# Patient Record
Sex: FEMALE | Race: WHITE | NOT HISPANIC OR LATINO | Employment: FULL TIME | URBAN - METROPOLITAN AREA
[De-identification: names, ages, dates, MRNs, and addresses within clinical notes are randomized per-mention and may not be internally consistent; named-entity substitution may affect disease eponyms.]

---

## 2023-11-11 PROBLEM — F41.9 ANXIETY: Status: ACTIVE | Noted: 2023-11-11

## 2023-11-11 PROBLEM — J30.9 ALLERGIC RHINITIS: Status: ACTIVE | Noted: 2023-11-11

## 2023-11-11 PROBLEM — R03.0 ELEVATED BLOOD PRESSURE, SITUATIONAL: Status: ACTIVE | Noted: 2023-11-11

## 2023-11-11 PROBLEM — E66.01 OBESITY, MORBID, BMI 40.0-49.9 (MULTI): Status: ACTIVE | Noted: 2023-11-11

## 2023-11-11 PROBLEM — K21.9 CHRONIC GERD: Status: ACTIVE | Noted: 2023-11-11

## 2023-11-11 PROBLEM — R31.9 HEMATURIA: Status: ACTIVE | Noted: 2023-11-11

## 2023-11-11 RX ORDER — FLUTICASONE PROPIONATE 50 MCG
1 SPRAY, SUSPENSION (ML) NASAL
COMMUNITY
Start: 2018-11-26 | End: 2024-01-05 | Stop reason: SDUPTHER

## 2023-11-11 RX ORDER — PANTOPRAZOLE SODIUM 40 MG/1
1 TABLET, DELAYED RELEASE ORAL DAILY
COMMUNITY
Start: 2018-11-26 | End: 2023-11-13 | Stop reason: SDUPTHER

## 2023-11-11 RX ORDER — ALPRAZOLAM 0.25 MG/1
1 TABLET ORAL DAILY PRN
COMMUNITY
Start: 2018-11-26 | End: 2024-01-05 | Stop reason: SDUPTHER

## 2023-11-13 ENCOUNTER — OFFICE VISIT (OUTPATIENT)
Dept: PRIMARY CARE | Facility: CLINIC | Age: 43
End: 2023-11-13
Payer: COMMERCIAL

## 2023-11-13 VITALS
HEART RATE: 89 BPM | SYSTOLIC BLOOD PRESSURE: 100 MMHG | OXYGEN SATURATION: 97 % | BODY MASS INDEX: 49.69 KG/M2 | WEIGHT: 293 LBS | DIASTOLIC BLOOD PRESSURE: 63 MMHG | TEMPERATURE: 97.4 F

## 2023-11-13 DIAGNOSIS — Z12.31 ENCOUNTER FOR SCREENING MAMMOGRAM FOR BREAST CANCER: ICD-10-CM

## 2023-11-13 DIAGNOSIS — R06.81 WITNESSED EPISODE OF APNEA: ICD-10-CM

## 2023-11-13 DIAGNOSIS — K21.9 CHRONIC GERD: ICD-10-CM

## 2023-11-13 DIAGNOSIS — F41.9 ANXIETY: ICD-10-CM

## 2023-11-13 DIAGNOSIS — Z00.00 ANNUAL PHYSICAL EXAM: Primary | ICD-10-CM

## 2023-11-13 DIAGNOSIS — E66.01 OBESITY, MORBID, BMI 40.0-49.9 (MULTI): ICD-10-CM

## 2023-11-13 PROBLEM — R31.9 HEMATURIA: Status: RESOLVED | Noted: 2023-11-11 | Resolved: 2023-11-13

## 2023-11-13 PROBLEM — R03.0 ELEVATED BLOOD PRESSURE, SITUATIONAL: Status: RESOLVED | Noted: 2023-11-11 | Resolved: 2023-11-13

## 2023-11-13 PROCEDURE — 99396 PREV VISIT EST AGE 40-64: CPT | Performed by: INTERNAL MEDICINE

## 2023-11-13 RX ORDER — PANTOPRAZOLE SODIUM 40 MG/1
40 TABLET, DELAYED RELEASE ORAL DAILY
Qty: 90 TABLET | Refills: 3 | Status: SHIPPED | OUTPATIENT
Start: 2023-11-13 | End: 2024-01-05 | Stop reason: SDUPTHER

## 2023-11-13 ASSESSMENT — PATIENT HEALTH QUESTIONNAIRE - PHQ9
1. LITTLE INTEREST OR PLEASURE IN DOING THINGS: NOT AT ALL
2. FEELING DOWN, DEPRESSED OR HOPELESS: NOT AT ALL
SUM OF ALL RESPONSES TO PHQ9 QUESTIONS 1 AND 2: 0

## 2023-11-13 NOTE — PROGRESS NOTES
Subjective   Patient ID: Karmen Sheets is a 43 y.o. female who presents for Annual Exam.  HPI    Patient is here for annual exam  The last health maintenance visit was 1 year(s) ago.    The patient's health since the last visit is described as good. There are no interval changes in the patient's PMH, PSH, and current medications. There are no interval changes in the patient's social and family history. She has regular dental visits. She denies vision problems. Vision care includes wearing soft contact lenses and an eye examination more than a year ago. She denies hearing loss. Immunizations status: not up to date . declines flu and covid shot and tdap.   Lifestyle: She consumes a diverse and healthy diet. She has weight concerns. She does not exercise regularly. She uses tobacco. She denies alcohol use. 5 cigarettes daily ,started at age 15.smoked one or two initially.   Reproductive health: No periods after ablation.she has had a tubal occlusion. she is sexually active. She is monogamous with a male partner.  Pregnancy History:  2 and 2 full term.   Cervical cancer screening: cancer screening reviewed and current . patient has no history of an abnormal pap smear. 2019.               Breast cancer screening: cancer screening reviewed and updated . mammogram ordered.   Metabolic screening: lipid profile performed  and glucose screening performed .   Takes xanx at end of year time period due to work stress  need refills.no drug use     has gerd for over 15 yrs and need medicine daily  never had egd.  Does not have time to do it.  No dysphagia or weight loss or appetite loss.  Change in bowels     allergies managed with flonase  Has snoring and daytime fatigue and sleepiness.  Family has noticed witnessed apnea and she wakes up coughing at night several times    Review of Systems  General: No significant change in weight, no fatigue, no fever, chills, or night sweats.  HEENT: No headache, dizziness, syncope.  No blurred vision, double vision. No hearing loss, or ringing. No nasal discharge, sinus problems. No loose teeth, sore throat, hoarseness or neck stiffness.   Breast: No pain or discharge. No mass felt.   Respiratory: No cough, mucous in throat, hemoptysis, wheezing, or shortness of breath. No snoring.  Cardiac: No chest pain, palpitations, orthopnea. No leg swelling or claudication pain.   Gastrointestinal: No indigestion, heartburn, nausea, vomiting, diarrhea, constipation, rectal bleeding or hemorrhoids.  Genitourinary: No UTI symptoms, stones, incontinence.  OBGYN: No abnormal bleeding, No pelvic pain or bloating.  Endocrine: No excess thirst or urination.  Hematopoietic: No anemia, easy bruising or bleeding. No history of blood transfusion.  Neuro: No localized weakness, numbness or tingling. No tremor, history of seizure. No history of memory problems.  Psychological: No anxiety, depression .  Has stress.     HISTORY  Social History     Socioeconomic History    Marital status:      Spouse name: Not on file    Number of children: Not on file    Years of education: Not on file    Highest education level: Not on file   Occupational History    Not on file   Tobacco Use    Smoking status: Every Day     Types: Cigarettes    Smokeless tobacco: Never   Substance and Sexual Activity    Alcohol use: Yes    Drug use: Never    Sexual activity: Not on file   Other Topics Concern    Not on file   Social History Narrative    Not on file     Social Determinants of Health     Financial Resource Strain: Not on file   Food Insecurity: Not on file   Transportation Needs: Not on file   Physical Activity: Not on file   Stress: Not on file   Social Connections: Not on file   Intimate Partner Violence: Not on file   Housing Stability: Not on file     Family History   Problem Relation Name Age of Onset    Coronary artery disease Mother      COPD Mother      Depression Mother      Other (Malignant Neoplasm of Tonsil) Father       Heart attack Father      Hashimoto's thyroiditis Sister      Leukemia Paternal Grandfather       Past Medical History:   Diagnosis Date    Other conditions influencing health status     HPV test positive    Personal history of diseases of the blood and blood-forming organs and certain disorders involving the immune mechanism 01/21/2019    History of anemia    Personal history of other diseases of the female genital tract 01/21/2019    History of menorrhagia    Personal history of other diseases of the female genital tract     History of abnormal uterine bleeding     History reviewed. No pertinent surgical history.    Objective   /63   Pulse 89   Temp 36.3 °C (97.4 °F)   Wt (!) 150 kg (331 lb 9.6 oz)   SpO2 97%   BMI 49.69 kg/m²      Lab Results   Component Value Date    WBC 7.7 02/16/2019    HGB 12.8 02/16/2019    HCT 40.7 02/16/2019    MCV 89 02/16/2019     02/16/2019      Physical Exam    Assessment/Plan   Problem List Items Addressed This Visit       Anxiety    Relevant Orders    Drug Screen, Urine With Reflex to Confirmation    Chronic GERD    Relevant Medications    pantoprazole (ProtoNix) 40 mg EC tablet    Other Relevant Orders    Vitamin D 25-Hydroxy,Total (for eval of Vitamin D levels)    Obesity, morbid, BMI 40.0-49.9 (CMS/HCC)    Annual physical exam - Primary    Relevant Orders    CBC and Auto Differential    Comprehensive Metabolic Panel    Lipid Panel    Vitamin D 25-Hydroxy,Total (for eval of Vitamin D levels)    Witnessed episode of apnea    Relevant Orders    Home sleep apnea test (HSAT)     Other Visit Diagnoses       Encounter for screening mammogram for breast cancer        Relevant Orders    BI mammo bilateral screening tomosynthesis          Patient examined counseling completed.  Medical conditions are stable.  Discussed importance of screening tests including colon cancer screening and mammogram and Pap.  She states that there is no time left after work and taking care of  family  Mammogram reordered  Routine labs ordered  She has symptoms of sleep apnea including witnessed apnea.  Will order a home sleep study  Takes alprazolam during end of the year due to stress at work.  Controlled substance agreement signed and drug screen ordered.  Will give referral after reviewing drug screen

## 2023-11-18 ENCOUNTER — LAB (OUTPATIENT)
Dept: LAB | Facility: LAB | Age: 43
End: 2023-11-18
Payer: COMMERCIAL

## 2023-11-18 DIAGNOSIS — Z00.00 ANNUAL PHYSICAL EXAM: ICD-10-CM

## 2023-11-18 DIAGNOSIS — F41.9 ANXIETY: ICD-10-CM

## 2023-11-18 DIAGNOSIS — K21.9 CHRONIC GERD: ICD-10-CM

## 2023-11-18 LAB
AMPHETAMINES UR QL SCN: NORMAL
BARBITURATES UR QL SCN: NORMAL
BENZODIAZ UR QL SCN: NORMAL
BZE UR QL SCN: NORMAL
CANNABINOIDS UR QL SCN: NORMAL
FENTANYL+NORFENTANYL UR QL SCN: NORMAL
OPIATES UR QL SCN: NORMAL
OXYCODONE+OXYMORPHONE UR QL SCN: NORMAL
PCP UR QL SCN: NORMAL

## 2023-11-18 PROCEDURE — 82306 VITAMIN D 25 HYDROXY: CPT

## 2023-11-18 PROCEDURE — 80053 COMPREHEN METABOLIC PANEL: CPT

## 2023-11-18 PROCEDURE — 85025 COMPLETE CBC W/AUTO DIFF WBC: CPT

## 2023-11-18 PROCEDURE — 80307 DRUG TEST PRSMV CHEM ANLYZR: CPT

## 2023-11-18 PROCEDURE — 36415 COLL VENOUS BLD VENIPUNCTURE: CPT

## 2023-11-18 PROCEDURE — 80061 LIPID PANEL: CPT

## 2023-11-19 LAB
25(OH)D3 SERPL-MCNC: 22 NG/ML (ref 30–100)
ALBUMIN SERPL BCP-MCNC: 3.9 G/DL (ref 3.4–5)
ALP SERPL-CCNC: 86 U/L (ref 33–110)
ALT SERPL W P-5'-P-CCNC: 13 U/L (ref 7–45)
ANION GAP SERPL CALC-SCNC: 11 MMOL/L (ref 10–20)
AST SERPL W P-5'-P-CCNC: 13 U/L (ref 9–39)
BASOPHILS # BLD AUTO: 0.05 X10*3/UL (ref 0–0.1)
BASOPHILS NFR BLD AUTO: 0.6 %
BILIRUB SERPL-MCNC: 0.4 MG/DL (ref 0–1.2)
BUN SERPL-MCNC: 15 MG/DL (ref 6–23)
CALCIUM SERPL-MCNC: 8.9 MG/DL (ref 8.6–10.6)
CHLORIDE SERPL-SCNC: 105 MMOL/L (ref 98–107)
CHOLEST SERPL-MCNC: 155 MG/DL (ref 0–199)
CHOLESTEROL/HDL RATIO: 3.5
CO2 SERPL-SCNC: 29 MMOL/L (ref 21–32)
CREAT SERPL-MCNC: 0.71 MG/DL (ref 0.5–1.05)
EOSINOPHIL # BLD AUTO: 0.19 X10*3/UL (ref 0–0.7)
EOSINOPHIL NFR BLD AUTO: 2.4 %
ERYTHROCYTE [DISTWIDTH] IN BLOOD BY AUTOMATED COUNT: 12.7 % (ref 11.5–14.5)
GFR SERPL CREATININE-BSD FRML MDRD: >90 ML/MIN/1.73M*2
GLUCOSE SERPL-MCNC: 89 MG/DL (ref 74–99)
HCT VFR BLD AUTO: 43.5 % (ref 36–46)
HDLC SERPL-MCNC: 44.8 MG/DL
HGB BLD-MCNC: 14.3 G/DL (ref 12–16)
IMM GRANULOCYTES # BLD AUTO: 0.02 X10*3/UL (ref 0–0.7)
IMM GRANULOCYTES NFR BLD AUTO: 0.2 % (ref 0–0.9)
LDLC SERPL CALC-MCNC: 97 MG/DL
LYMPHOCYTES # BLD AUTO: 1.87 X10*3/UL (ref 1.2–4.8)
LYMPHOCYTES NFR BLD AUTO: 23.3 %
MCH RBC QN AUTO: 29.4 PG (ref 26–34)
MCHC RBC AUTO-ENTMCNC: 32.9 G/DL (ref 32–36)
MCV RBC AUTO: 90 FL (ref 80–100)
MONOCYTES # BLD AUTO: 0.48 X10*3/UL (ref 0.1–1)
MONOCYTES NFR BLD AUTO: 6 %
NEUTROPHILS # BLD AUTO: 5.43 X10*3/UL (ref 1.2–7.7)
NEUTROPHILS NFR BLD AUTO: 67.5 %
NON HDL CHOLESTEROL: 110 MG/DL (ref 0–149)
NRBC BLD-RTO: 0 /100 WBCS (ref 0–0)
PLATELET # BLD AUTO: 283 X10*3/UL (ref 150–450)
POTASSIUM SERPL-SCNC: 4.1 MMOL/L (ref 3.5–5.3)
PROT SERPL-MCNC: 6.5 G/DL (ref 6.4–8.2)
RBC # BLD AUTO: 4.86 X10*6/UL (ref 4–5.2)
SODIUM SERPL-SCNC: 141 MMOL/L (ref 136–145)
TRIGL SERPL-MCNC: 66 MG/DL (ref 0–149)
VLDL: 13 MG/DL (ref 0–40)
WBC # BLD AUTO: 8 X10*3/UL (ref 4.4–11.3)

## 2023-11-20 NOTE — RESULT ENCOUNTER NOTE
Reviewed your labs.  Vitamin D is low.  Take D3 1000 units daily and if you are already taking it double up on the supplements.  Other labs are fine

## 2024-01-05 DIAGNOSIS — J30.9 ALLERGIC RHINITIS, UNSPECIFIED SEASONALITY, UNSPECIFIED TRIGGER: ICD-10-CM

## 2024-01-05 DIAGNOSIS — K21.9 CHRONIC GERD: ICD-10-CM

## 2024-01-05 DIAGNOSIS — F41.9 ANXIETY: Primary | ICD-10-CM

## 2024-01-05 RX ORDER — FLUTICASONE PROPIONATE 50 MCG
1 SPRAY, SUSPENSION (ML) NASAL DAILY
Qty: 16 G | Refills: 3 | Status: SHIPPED | OUTPATIENT
Start: 2024-01-05

## 2024-01-05 RX ORDER — ALPRAZOLAM 0.25 MG/1
0.25 TABLET ORAL DAILY PRN
Qty: 90 TABLET | Refills: 0 | Status: SHIPPED | OUTPATIENT
Start: 2024-01-05 | End: 2024-04-04

## 2024-01-05 RX ORDER — PANTOPRAZOLE SODIUM 40 MG/1
40 TABLET, DELAYED RELEASE ORAL DAILY
Qty: 90 TABLET | Refills: 3 | Status: SHIPPED | OUTPATIENT
Start: 2024-01-05 | End: 2025-01-04

## 2024-03-01 ENCOUNTER — CLINICAL SUPPORT (OUTPATIENT)
Dept: SLEEP MEDICINE | Facility: CLINIC | Age: 44
End: 2024-03-01
Payer: COMMERCIAL

## 2024-03-01 DIAGNOSIS — R06.81 WITNESSED EPISODE OF APNEA: ICD-10-CM

## 2024-03-01 DIAGNOSIS — G47.33 OBSTRUCTIVE SLEEP APNEA (ADULT) (PEDIATRIC): ICD-10-CM

## 2024-03-01 PROCEDURE — 95806 SLEEP STUDY UNATT&RESP EFFT: CPT | Performed by: INTERNAL MEDICINE

## 2024-03-02 NOTE — PROGRESS NOTES
Type of Study: HOME SLEEP STUDY - NOMAD     The patient received equipment and instructions for use of the [employer] Nomad HSAT device. The patient was instructed how to apply the effort belts, cannula, thermistor. It was also explained how the Nomad and oximeter components work.  The patient was asked to record their sleep for an 8-hour period.     The patient was informed of their responsibility for the device and acknowledged this by signing the HSAT device contract. The patient was asked to return the device on 3-2-24 after 7PM to the [facility].     The patient was instructed to call 911 as usual for any medical- emergencies while at home.  The patient was also given a phone number for troubleshooting when using the device in case there were additional questions.

## 2024-03-04 ASSESSMENT — SLEEP AND FATIGUE QUESTIONNAIRES
HOW LIKELY ARE YOU TO NOD OFF OR FALL ASLEEP WHILE WATCHING TV: MODERATE CHANCE OF DOZING
ESS-CHAD TOTAL SCORE: 14
HOW LIKELY ARE YOU TO NOD OFF OR FALL ASLEEP WHEN YOU ARE A PASSENGER IN A CAR FOR AN HOUR WITHOUT A BREAK: MODERATE CHANCE OF DOZING
HOW LIKELY ARE YOU TO NOD OFF OR FALL ASLEEP WHILE SITTING QUIETLY AFTER LUNCH WITHOUT ALCOHOL: SLIGHT CHANCE OF DOZING
HOW LIKELY ARE YOU TO NOD OFF OR FALL ASLEEP WHILE SITTING AND READING: SLIGHT CHANCE OF DOZING
HOW LIKELY ARE YOU TO NOD OFF OR FALL ASLEEP IN A CAR, WHILE STOPPED FOR A FEW MINUTES IN TRAFFIC: SLIGHT CHANCE OF DOZING
HOW LIKELY ARE YOU TO NOD OFF OR FALL ASLEEP WHILE SITTING AND TALKING TO SOMEONE: SLIGHT CHANCE OF DOZING
SITING INACTIVE IN A PUBLIC PLACE LIKE A CLASS ROOM OR A MOVIE THEATER: HIGH CHANCE OF DOZING
HOW LIKELY ARE YOU TO NOD OFF OR FALL ASLEEP WHILE LYING DOWN TO REST IN THE AFTERNOON WHEN CIRCUMSTANCES PERMIT: HIGH CHANCE OF DOZING

## 2024-03-05 NOTE — RESULT ENCOUNTER NOTE
Got sleep study results which came back abnormal.  Please call me to discuss  It shows moderate sleep apnea and also low oxygenation during sleep  And we need to start treatment right away  Please make a follow-up with me or we will talk about referring you to sleep specialist

## 2024-03-07 DIAGNOSIS — G47.33 OSA (OBSTRUCTIVE SLEEP APNEA): Primary | ICD-10-CM

## 2024-03-14 ENCOUNTER — APPOINTMENT (OUTPATIENT)
Dept: SLEEP MEDICINE | Facility: CLINIC | Age: 44
End: 2024-03-14
Payer: COMMERCIAL

## 2024-06-20 ENCOUNTER — APPOINTMENT (OUTPATIENT)
Dept: SLEEP MEDICINE | Facility: CLINIC | Age: 44
End: 2024-06-20
Payer: COMMERCIAL

## 2024-07-02 ENCOUNTER — APPOINTMENT (OUTPATIENT)
Dept: SLEEP MEDICINE | Facility: CLINIC | Age: 44
End: 2024-07-02
Payer: COMMERCIAL

## 2024-11-13 ENCOUNTER — APPOINTMENT (OUTPATIENT)
Dept: PRIMARY CARE | Facility: CLINIC | Age: 44
End: 2024-11-13
Payer: COMMERCIAL

## 2024-11-13 VITALS
BODY MASS INDEX: 43.4 KG/M2 | DIASTOLIC BLOOD PRESSURE: 80 MMHG | HEIGHT: 69 IN | WEIGHT: 293 LBS | OXYGEN SATURATION: 97 % | SYSTOLIC BLOOD PRESSURE: 138 MMHG | HEART RATE: 115 BPM

## 2024-11-13 DIAGNOSIS — Z00.00 ANNUAL PHYSICAL EXAM: Primary | ICD-10-CM

## 2024-11-13 DIAGNOSIS — Z12.11 SCREENING FOR COLON CANCER: ICD-10-CM

## 2024-11-13 DIAGNOSIS — E66.01 OBESITY, MORBID, BMI 40.0-49.9 (MULTI): ICD-10-CM

## 2024-11-13 DIAGNOSIS — G47.33 OSA (OBSTRUCTIVE SLEEP APNEA): ICD-10-CM

## 2024-11-13 DIAGNOSIS — Z12.31 ENCOUNTER FOR SCREENING MAMMOGRAM FOR BREAST CANCER: ICD-10-CM

## 2024-11-13 DIAGNOSIS — K21.9 CHRONIC GERD: ICD-10-CM

## 2024-11-13 DIAGNOSIS — F41.9 ANXIETY: ICD-10-CM

## 2024-11-13 PROCEDURE — 99396 PREV VISIT EST AGE 40-64: CPT | Performed by: INTERNAL MEDICINE

## 2024-11-13 PROCEDURE — 3008F BODY MASS INDEX DOCD: CPT | Performed by: INTERNAL MEDICINE

## 2024-11-13 RX ORDER — ALPRAZOLAM 0.25 MG/1
0.25 TABLET ORAL DAILY PRN
Qty: 90 TABLET | Refills: 0 | Status: SHIPPED | OUTPATIENT
Start: 2024-11-13 | End: 2025-02-11

## 2024-11-13 RX ORDER — PANTOPRAZOLE SODIUM 40 MG/1
40 TABLET, DELAYED RELEASE ORAL DAILY
Qty: 90 TABLET | Refills: 3 | Status: SHIPPED | OUTPATIENT
Start: 2024-11-13 | End: 2025-11-13

## 2024-11-13 ASSESSMENT — PATIENT HEALTH QUESTIONNAIRE - PHQ9
SUM OF ALL RESPONSES TO PHQ9 QUESTIONS 1 AND 2: 0
2. FEELING DOWN, DEPRESSED OR HOPELESS: NOT AT ALL
1. LITTLE INTEREST OR PLEASURE IN DOING THINGS: NOT AT ALL

## 2024-11-13 NOTE — PROGRESS NOTES
Subjective   Patient ID: Karmen Sheets is a 44 y.o. female who presents for Annual Exam (No pap.).  HPI    Patient is here for annual exam  Does not have any new concerns today  sees dentist regularly.    Diet is healthy 40% of the time.    Does not do regular exercise now.  pregnancy [id number]  No bladder symptoms  Cervical cancer screen current normal ,due  No history of abnormal Pap  Mammogram due   Colonoscopy due  Medical conditions: anxiety controlled on meds  Gerd controlled, never had EGD  Was not able to see sleep specialist  Vaccines:declined    Review of Systems  General: No significant change in weight, no fatigue, no fever, chills,  HEENT: No headache, dizziness, syncope. No blurred vision, double vision. No hearing loss, or ringing. No nasal discharge, sinus problems. No loose teeth, sore throat, hoarseness or neck stiffness.   Breast: No pain or discharge. No mass felt.   Respiratory: No cough, mucous in throat, hemoptysis, wheezing, or shortness of breath.has snoring and apnea  Cardiac: No chest pain, palpitations, orthopnea. No leg swelling or claudication pain.   Gastrointestinal: No indigestion,has  heartburn, no nausea, vomiting, diarrhea, constipation, rectal bleeding or hemorrhoids.  Genitourinary: No UTI symptoms, stones, incontinence.  OBGYN: No abnormal bleeding, No pelvic pain or bloating.  Endocrine: No excess thirst or urination.  Hematopoietic: No anemia, easy bruising or bleeding. No history of blood transfusion.  Neuro: No localized weakness, numbness or tingling. No tremor, history of seizure. No history of memory problems.  Psychological: No depression    HISTORY  Social History     Socioeconomic History    Marital status:      Spouse name: Not on file    Number of children: Not on file    Years of education: Not on file    Highest education level: Not on file   Occupational History    Not on file   Tobacco Use    Smoking status: Every Day     Types: Cigarettes     "Smokeless tobacco: Never   Substance and Sexual Activity    Alcohol use: Yes    Drug use: Never    Sexual activity: Not on file   Other Topics Concern    Not on file   Social History Narrative    Not on file     Social Drivers of Health     Financial Resource Strain: Not on file   Food Insecurity: Not on file   Transportation Needs: Not on file   Physical Activity: Not on file   Stress: Not on file   Social Connections: Not on file   Intimate Partner Violence: Not on file   Housing Stability: Not on file     Family History   Problem Relation Name Age of Onset    Coronary artery disease Mother      COPD Mother      Depression Mother      Other (Malignant Neoplasm of Tonsil) Father      Heart attack Father      Hashimoto's thyroiditis Sister      Leukemia Paternal Grandfather       Past Medical History:   Diagnosis Date    Other conditions influencing health status     HPV test positive    Personal history of diseases of the blood and blood-forming organs and certain disorders involving the immune mechanism 01/21/2019    History of anemia    Personal history of other diseases of the female genital tract 01/21/2019    History of menorrhagia    Personal history of other diseases of the female genital tract     History of abnormal uterine bleeding     History reviewed. No pertinent surgical history.  @VACCINES  Objective   /80   Pulse (!) 115   Ht 1.74 m (5' 8.5\")   Wt (!) 153 kg (337 lb)   SpO2 97%   BMI 50.50 kg/m²      Lab Results   Component Value Date    WBC 8.0 11/18/2023    HGB 14.3 11/18/2023    HCT 43.5 11/18/2023    MCV 90 11/18/2023     11/18/2023   PAP@MAMMOGRAM  Physical Exam  Constitutional: Alert and in no acute distress. Well developed, well nourished.   Eyes: Normal external exam. Pupils were equal in size, round, reactive to light (PERRL) with normal accommodation and extraocular movements intact (EOMI).   Ears, Nose, Mouth, and Throat: Otoscopic examination: Normal.  Hearing: Normal.  "   Neck: No neck mass was observed. Supple. Thyroid not enlarged and there were no palpable thyroid nodules.   Cardiovascular: Heart rate and rhythm were normal, normal S1 and S2, no gallops, no murmurs and no pericardial rub. Pedal pulses: Normal. No peripheral edema.   Pulmonary: No respiratory distress. Clear bilateral breath sounds.   Abdomen: Soft nontender; no abdominal mass palpated. No organomegaly. declined.   Genitourinary: Sees GYN  Musculoskeletal: Gait and station: Normal. Has thickening of the palmar tendon with probable ganglion cyst on right side. Range of motion: Normal.  Muscle strength/tone: Normal.    Skin: Striae abdomen  Psychiatric: Judgment and insight: Intact. Mood and affect: Normal.   Lymphatic: No cervical lymphadenopathy.       Assessment/Plan   Problem List Items Addressed This Visit       Anxiety    Relevant Medications    ALPRAZolam (Xanax) 0.25 mg tablet    Chronic GERD    Relevant Medications    pantoprazole (ProtoNix) 40 mg EC tablet    Other Relevant Orders    Referral to Gastroenterology    Obesity, morbid, BMI 40.0-49.9 (Multi)    Relevant Orders    Cortisol AM    Annual physical exam - Primary    Relevant Orders    Comprehensive Metabolic Panel    CBC and Auto Differential    Lipid Panel    ADRIEL (obstructive sleep apnea)    Relevant Orders    In-Center Sleep Study (Non-Sleep Provider Only)     Other Visit Diagnoses       Encounter for screening mammogram for breast cancer        Relevant Orders    BI mammo bilateral screening tomosynthesis    Screening for colon cancer        Relevant Orders    Referral to Gastroenterology          Preventive exam and counseling completed  She declines all vaccines  Due for colonoscopy and mammogram and Pap  She will make appointment with gynecologist  Mammogram ordered  She also needs endoscopy and would like to do it at the same time  Referral to GI  Has significant sleep apnea with hypoxemia  Will order CPAP titration  Check routine labs.  Add  cortisol due to obesity and striate formation and stress  Discussed treatment options including medications for weight loss  Start with some diet changes and treatment of medical problem like sleep apnea  Consider medication in the future  Anxiety controlled on treatment.  Uses medication during tax season.  Has signed controlled substance agreement  Follow-up in 1 year and as needed  Will call with sleep study results    Addendum  Completed peer to peer discussion regarding CPAP titration study.  Insurance denied it since home sleep study is adequate  They will authorize CPAP machine and supplies.

## 2024-11-16 ENCOUNTER — LAB (OUTPATIENT)
Dept: LAB | Facility: LAB | Age: 44
End: 2024-11-16
Payer: COMMERCIAL

## 2024-11-16 DIAGNOSIS — Z00.00 ANNUAL PHYSICAL EXAM: ICD-10-CM

## 2024-11-16 DIAGNOSIS — E66.01 OBESITY, MORBID, BMI 40.0-49.9 (MULTI): ICD-10-CM

## 2024-11-16 LAB
ALBUMIN SERPL BCP-MCNC: 4.1 G/DL (ref 3.4–5)
ALP SERPL-CCNC: 85 U/L (ref 33–110)
ALT SERPL W P-5'-P-CCNC: 17 U/L (ref 7–45)
ANION GAP SERPL CALC-SCNC: 13 MMOL/L (ref 10–20)
AST SERPL W P-5'-P-CCNC: 16 U/L (ref 9–39)
BASOPHILS # BLD AUTO: 0.05 X10*3/UL (ref 0–0.1)
BASOPHILS NFR BLD AUTO: 0.5 %
BILIRUB SERPL-MCNC: 0.5 MG/DL (ref 0–1.2)
BUN SERPL-MCNC: 12 MG/DL (ref 6–23)
CALCIUM SERPL-MCNC: 9.1 MG/DL (ref 8.6–10.6)
CHLORIDE SERPL-SCNC: 103 MMOL/L (ref 98–107)
CHOLEST SERPL-MCNC: 151 MG/DL (ref 0–199)
CHOLESTEROL/HDL RATIO: 3.5
CO2 SERPL-SCNC: 28 MMOL/L (ref 21–32)
CORTIS AM PEAK SERPL-MSCNC: 17.3 UG/DL (ref 5–20)
CREAT SERPL-MCNC: 0.72 MG/DL (ref 0.5–1.05)
EGFRCR SERPLBLD CKD-EPI 2021: >90 ML/MIN/1.73M*2
EOSINOPHIL # BLD AUTO: 0.22 X10*3/UL (ref 0–0.7)
EOSINOPHIL NFR BLD AUTO: 2.3 %
ERYTHROCYTE [DISTWIDTH] IN BLOOD BY AUTOMATED COUNT: 12.6 % (ref 11.5–14.5)
GLUCOSE SERPL-MCNC: 115 MG/DL (ref 74–99)
HCT VFR BLD AUTO: 44.2 % (ref 36–46)
HDLC SERPL-MCNC: 43 MG/DL
HGB BLD-MCNC: 14.4 G/DL (ref 12–16)
IMM GRANULOCYTES # BLD AUTO: 0.03 X10*3/UL (ref 0–0.7)
IMM GRANULOCYTES NFR BLD AUTO: 0.3 % (ref 0–0.9)
LDLC SERPL CALC-MCNC: 90 MG/DL
LYMPHOCYTES # BLD AUTO: 1.89 X10*3/UL (ref 1.2–4.8)
LYMPHOCYTES NFR BLD AUTO: 19.7 %
MCH RBC QN AUTO: 28.7 PG (ref 26–34)
MCHC RBC AUTO-ENTMCNC: 32.6 G/DL (ref 32–36)
MCV RBC AUTO: 88 FL (ref 80–100)
MONOCYTES # BLD AUTO: 0.46 X10*3/UL (ref 0.1–1)
MONOCYTES NFR BLD AUTO: 4.8 %
NEUTROPHILS # BLD AUTO: 6.95 X10*3/UL (ref 1.2–7.7)
NEUTROPHILS NFR BLD AUTO: 72.4 %
NON HDL CHOLESTEROL: 108 MG/DL (ref 0–149)
NRBC BLD-RTO: 0 /100 WBCS (ref 0–0)
PLATELET # BLD AUTO: 284 X10*3/UL (ref 150–450)
POTASSIUM SERPL-SCNC: 4.2 MMOL/L (ref 3.5–5.3)
PROT SERPL-MCNC: 7.1 G/DL (ref 6.4–8.2)
RBC # BLD AUTO: 5.01 X10*6/UL (ref 4–5.2)
SODIUM SERPL-SCNC: 140 MMOL/L (ref 136–145)
TRIGL SERPL-MCNC: 89 MG/DL (ref 0–149)
VLDL: 18 MG/DL (ref 0–40)
WBC # BLD AUTO: 9.6 X10*3/UL (ref 4.4–11.3)

## 2024-11-16 PROCEDURE — 80061 LIPID PANEL: CPT

## 2024-11-16 PROCEDURE — 36415 COLL VENOUS BLD VENIPUNCTURE: CPT

## 2024-11-16 PROCEDURE — 80053 COMPREHEN METABOLIC PANEL: CPT

## 2024-11-16 PROCEDURE — 85025 COMPLETE CBC W/AUTO DIFF WBC: CPT

## 2024-11-16 PROCEDURE — 82533 TOTAL CORTISOL: CPT

## 2024-11-21 ENCOUNTER — HOSPITAL ENCOUNTER (OUTPATIENT)
Dept: RADIOLOGY | Facility: CLINIC | Age: 44
Discharge: HOME | End: 2024-11-21
Payer: COMMERCIAL

## 2024-11-21 VITALS — WEIGHT: 293 LBS | BODY MASS INDEX: 44.41 KG/M2 | HEIGHT: 68 IN

## 2024-11-21 DIAGNOSIS — Z12.31 ENCOUNTER FOR SCREENING MAMMOGRAM FOR BREAST CANCER: ICD-10-CM

## 2024-11-21 PROCEDURE — 77067 SCR MAMMO BI INCL CAD: CPT

## 2024-12-12 DIAGNOSIS — G47.33 OSA (OBSTRUCTIVE SLEEP APNEA): Primary | ICD-10-CM

## 2024-12-16 ENCOUNTER — APPOINTMENT (OUTPATIENT)
Dept: SLEEP MEDICINE | Facility: CLINIC | Age: 44
End: 2024-12-16
Payer: COMMERCIAL

## 2025-03-11 ENCOUNTER — APPOINTMENT (OUTPATIENT)
Dept: PRIMARY CARE | Facility: CLINIC | Age: 45
End: 2025-03-11
Payer: COMMERCIAL

## 2025-03-11 VITALS
SYSTOLIC BLOOD PRESSURE: 140 MMHG | OXYGEN SATURATION: 97 % | DIASTOLIC BLOOD PRESSURE: 84 MMHG | WEIGHT: 293 LBS | HEIGHT: 68 IN | BODY MASS INDEX: 44.41 KG/M2 | HEART RATE: 88 BPM

## 2025-03-11 DIAGNOSIS — E66.01 OBESITY, MORBID, BMI 40.0-49.9 (MULTI): ICD-10-CM

## 2025-03-11 DIAGNOSIS — G47.33 OSA (OBSTRUCTIVE SLEEP APNEA): ICD-10-CM

## 2025-03-11 DIAGNOSIS — R73.9 HYPERGLYCEMIA: Primary | ICD-10-CM

## 2025-03-11 DIAGNOSIS — M25.50 ARTHRALGIA, UNSPECIFIED JOINT: ICD-10-CM

## 2025-03-11 PROCEDURE — 99214 OFFICE O/P EST MOD 30 MIN: CPT | Performed by: INTERNAL MEDICINE

## 2025-03-11 PROCEDURE — 3008F BODY MASS INDEX DOCD: CPT | Performed by: INTERNAL MEDICINE

## 2025-03-11 ASSESSMENT — PATIENT HEALTH QUESTIONNAIRE - PHQ9
SUM OF ALL RESPONSES TO PHQ9 QUESTIONS 1 & 2: 0
1. LITTLE INTEREST OR PLEASURE IN DOING THINGS: NOT AT ALL
2. FEELING DOWN, DEPRESSED OR HOPELESS: NOT AT ALL

## 2025-03-11 NOTE — PROGRESS NOTES
"Subjective   Patient ID: Karmen Sheets is a 44 y.o. female who presents for Follow-up (Sleep Apnea/Aches and Pains everywhere) and Weight Management.    HPI   Has been using cpap.cannot tolerate it that much  Hurts her nose  Using it at least 4 hours  Does not feel any change with it  Has aches and pains every where  Arm goes cold and numb  Has joint pains in knees and shoulder and other joints  Interested in weight loss medication  Work is very stressful    Review of Systems  No fever or chills  Has weight gain  No polyuria polydipsia  No depression  Objective   /84   Pulse 88   Ht 1.727 m (5' 8\")   Wt (!) 158 kg (348 lb 6.4 oz)   SpO2 97%   BMI 52.97 kg/m²     Physical Exam  Not in acute distress  No pallor or icterus  Neck supple  Chest is clear  CVS: S1-S2 regular rate and rhythm  Examination of the knee and shoulder joints shows no effusion.  Range of motion okay  No finger synovitis  Assessment/Plan   Problem List Items Addressed This Visit             ICD-10-CM    Obesity, morbid, BMI 40.0-49.9 (Multi) E66.01    Relevant Orders    TSH with reflex to Free T4 if abnormal    Vitamin B12    ADRIEL (obstructive sleep apnea) G47.33    Relevant Orders    Referral to Adult Sleep Medicine    Hyperglycemia - Primary R73.9    Relevant Orders    CBC and Auto Differential    Hemoglobin A1C    Vitamin B12    Arthralgia M25.50    Relevant Orders    CBC and Auto Differential    Comprehensive Metabolic Panel    Vitamin D 25-Hydroxy,Total (for eval of Vitamin D levels)    Vitamin B12    Uric acid     Patient has difficulty using her CPAP.  Does not see any improvement in symptoms.  Using it for 4 hours.  Will refer to sleep specialist for further recommendation  Will work on weight loss  Discussed zepbound which  is approved for sleep apnea and also she could benefit from weight loss  Will check labs including A1c before starting medications  Additional labs due to her joint pains and numbness  Follow-up in 3 " months  Try to schedule Pap and colonoscopy and upper endoscopy

## 2025-03-12 DIAGNOSIS — E66.813 CLASS 3 SEVERE OBESITY DUE TO EXCESS CALORIES WITH SERIOUS COMORBIDITY AND BODY MASS INDEX (BMI) OF 50.0 TO 59.9 IN ADULT: ICD-10-CM

## 2025-03-12 DIAGNOSIS — E66.01 OBESITY, MORBID, BMI 40.0-49.9 (MULTI): ICD-10-CM

## 2025-03-12 DIAGNOSIS — G47.33 OSA (OBSTRUCTIVE SLEEP APNEA): Primary | ICD-10-CM

## 2025-03-12 DIAGNOSIS — E66.01 CLASS 3 SEVERE OBESITY DUE TO EXCESS CALORIES WITH SERIOUS COMORBIDITY AND BODY MASS INDEX (BMI) OF 50.0 TO 59.9 IN ADULT: ICD-10-CM

## 2025-03-12 DIAGNOSIS — G47.33 OSA (OBSTRUCTIVE SLEEP APNEA): ICD-10-CM

## 2025-03-12 LAB
25(OH)D3+25(OH)D2 SERPL-MCNC: 39 NG/ML (ref 30–100)
ALBUMIN SERPL-MCNC: 4.2 G/DL (ref 3.6–5.1)
ALP SERPL-CCNC: 85 U/L (ref 31–125)
ALT SERPL-CCNC: 18 U/L (ref 6–29)
ANION GAP SERPL CALCULATED.4IONS-SCNC: 12 MMOL/L (CALC) (ref 7–17)
AST SERPL-CCNC: 18 U/L (ref 10–35)
BASOPHILS # BLD AUTO: 44 CELLS/UL (ref 0–200)
BASOPHILS NFR BLD AUTO: 0.5 %
BILIRUB SERPL-MCNC: 0.4 MG/DL (ref 0.2–1.2)
BUN SERPL-MCNC: 16 MG/DL (ref 7–25)
CALCIUM SERPL-MCNC: 9.5 MG/DL (ref 8.6–10.2)
CHLORIDE SERPL-SCNC: 105 MMOL/L (ref 98–110)
CO2 SERPL-SCNC: 23 MMOL/L (ref 20–32)
CREAT SERPL-MCNC: 0.75 MG/DL (ref 0.5–0.99)
EGFRCR SERPLBLD CKD-EPI 2021: 99 ML/MIN/1.73M2
EOSINOPHIL # BLD AUTO: 139 CELLS/UL (ref 15–500)
EOSINOPHIL NFR BLD AUTO: 1.6 %
ERYTHROCYTE [DISTWIDTH] IN BLOOD BY AUTOMATED COUNT: 13 % (ref 11–15)
EST. AVERAGE GLUCOSE BLD GHB EST-MCNC: 117 MG/DL
EST. AVERAGE GLUCOSE BLD GHB EST-SCNC: 6.5 MMOL/L
GLUCOSE SERPL-MCNC: 85 MG/DL (ref 65–139)
HBA1C MFR BLD: 5.7 % OF TOTAL HGB
HCT VFR BLD AUTO: 43.2 % (ref 35–45)
HGB BLD-MCNC: 14.2 G/DL (ref 11.7–15.5)
LYMPHOCYTES # BLD AUTO: 1688 CELLS/UL (ref 850–3900)
LYMPHOCYTES NFR BLD AUTO: 19.4 %
MCH RBC QN AUTO: 29 PG (ref 27–33)
MCHC RBC AUTO-ENTMCNC: 32.9 G/DL (ref 32–36)
MCV RBC AUTO: 88.2 FL (ref 80–100)
MONOCYTES # BLD AUTO: 496 CELLS/UL (ref 200–950)
MONOCYTES NFR BLD AUTO: 5.7 %
NEUTROPHILS # BLD AUTO: 6334 CELLS/UL (ref 1500–7800)
NEUTROPHILS NFR BLD AUTO: 72.8 %
PLATELET # BLD AUTO: 282 THOUSAND/UL (ref 140–400)
PMV BLD REES-ECKER: 9.9 FL (ref 7.5–12.5)
POTASSIUM SERPL-SCNC: 4 MMOL/L (ref 3.5–5.3)
PROT SERPL-MCNC: 7.2 G/DL (ref 6.1–8.1)
RBC # BLD AUTO: 4.9 MILLION/UL (ref 3.8–5.1)
SODIUM SERPL-SCNC: 140 MMOL/L (ref 135–146)
TSH SERPL-ACNC: 2.15 MIU/L
URATE SERPL-MCNC: 5.8 MG/DL (ref 2.5–7)
VIT B12 SERPL-MCNC: 351 PG/ML (ref 200–1100)
WBC # BLD AUTO: 8.7 THOUSAND/UL (ref 3.8–10.8)

## 2025-03-13 RX ORDER — TIRZEPATIDE 2.5 MG/.5ML
2.5 INJECTION, SOLUTION SUBCUTANEOUS
Qty: 2 ML | Refills: 0 | Status: SHIPPED | OUTPATIENT
Start: 2025-03-16 | End: 2025-04-15

## 2025-04-03 ENCOUNTER — APPOINTMENT (OUTPATIENT)
Dept: PRIMARY CARE | Facility: CLINIC | Age: 45
End: 2025-04-03
Payer: COMMERCIAL

## 2025-04-03 VITALS
HEART RATE: 80 BPM | BODY MASS INDEX: 44.41 KG/M2 | WEIGHT: 293 LBS | SYSTOLIC BLOOD PRESSURE: 145 MMHG | DIASTOLIC BLOOD PRESSURE: 84 MMHG | OXYGEN SATURATION: 97 % | HEIGHT: 68 IN

## 2025-04-03 DIAGNOSIS — I10 ESSENTIAL HYPERTENSION: Primary | ICD-10-CM

## 2025-04-03 DIAGNOSIS — E66.01 OBESITY, MORBID, BMI 40.0-49.9 (MULTI): ICD-10-CM

## 2025-04-03 PROCEDURE — 3077F SYST BP >= 140 MM HG: CPT | Performed by: INTERNAL MEDICINE

## 2025-04-03 PROCEDURE — 99213 OFFICE O/P EST LOW 20 MIN: CPT | Performed by: INTERNAL MEDICINE

## 2025-04-03 PROCEDURE — 3079F DIAST BP 80-89 MM HG: CPT | Performed by: INTERNAL MEDICINE

## 2025-04-03 PROCEDURE — 3008F BODY MASS INDEX DOCD: CPT | Performed by: INTERNAL MEDICINE

## 2025-04-03 RX ORDER — SEMAGLUTIDE 0.25 MG/.5ML
0.25 INJECTION, SOLUTION SUBCUTANEOUS WEEKLY
Qty: 2 ML | Refills: 0 | Status: SHIPPED | OUTPATIENT
Start: 2025-04-03 | End: 2025-04-25

## 2025-04-03 RX ORDER — TRIAMTERENE/HYDROCHLOROTHIAZID 37.5-25 MG
1 TABLET ORAL DAILY
Qty: 30 TABLET | Refills: 1 | Status: SHIPPED | OUTPATIENT
Start: 2025-04-03 | End: 2025-06-02

## 2025-04-03 ASSESSMENT — PATIENT HEALTH QUESTIONNAIRE - PHQ9
SUM OF ALL RESPONSES TO PHQ9 QUESTIONS 1 AND 2: 0
1. LITTLE INTEREST OR PLEASURE IN DOING THINGS: NOT AT ALL
2. FEELING DOWN, DEPRESSED OR HOPELESS: NOT AT ALL

## 2025-04-03 ASSESSMENT — ENCOUNTER SYMPTOMS: DEPRESSION: 0

## 2025-04-03 NOTE — PROGRESS NOTES
"Subjective   Patient ID: Karmen Sheets is a 44 y.o. female who presents for Follow-up.    HPI   Insurance did not approve weight loss medication.  Has not been able to lose any weight  Cannot tolerate CPAP  Joint pain comes and goes  Review of Systems  No fever chills  No nausea vomiting diarrhea  Objective   /84   Pulse 80   Ht 1.727 m (5' 8\")   Wt (!) 158 kg (348 lb)   SpO2 97%   BMI 52.91 kg/m²     Physical Exam  In NAD  Chest is clear  CVS: S1-S2 regular rate and rhythm  Extremities no edema  Assessment/Plan   Problem List Items Addressed This Visit             ICD-10-CM    Obesity, morbid, BMI 40.0-49.9 (Multi) E66.01    Relevant Medications    semaglutide, weight loss, (Wegovy) 0.25 mg/0.5 mL pen injector    Other Relevant Orders    Referral to Bariatric Surgery    Essential hypertension - Primary I10    Relevant Medications    triamterene-hydrochlorothiazid (Maxzide-25) 37.5-25 mg tablet     Blood pressure remains high.  Will start medication  Continue DASH diet  Has not been able to get approval for weight loss medicine.  She has sleep apnea too  Has specialist appointment  Will refer to bariatric surgery     "

## 2025-04-17 DIAGNOSIS — I10 ESSENTIAL HYPERTENSION: ICD-10-CM

## 2025-04-17 RX ORDER — TRIAMTERENE/HYDROCHLOROTHIAZID 37.5-25 MG
1 TABLET ORAL DAILY
Qty: 90 TABLET | Refills: 1 | Status: SHIPPED | OUTPATIENT
Start: 2025-04-17

## 2025-05-05 NOTE — PROGRESS NOTES
"Subjective     Date: 5/16/2025 Time: 8:56 AM  Name: Karmen Sheets  MRN: [id number]    This is a 44 y.o. female with morbid obesity (There is no height or weight on file to calculate BMI.) who presents to clinic for consideration of bariatric surgery. she has attempted and failed multiple diet and exercise regimens for weight loss. Initial Onset of obesity was 25 Year(s) Ago after using the Depo-Provera injectable contraceptive.  Their goal for surgery is to  be healthier  and lose weight, insurance doesn't cover GLP1 inj. The patient has tried multiple diets to lose weight including Low Carb, Medications , and otc diet pills, weight watchers other. The patient was most successful with the Weight Watchers . The most pounds lost on this diet were 10 lbs. The patient considers their dietary weakness to be portion size The patient reports a  highest weight ever of 348 pounds, lowest weight ever of 140 pounds, and frequent weight loss and weight gain. Distribution of Obesity: is central.The patient does yard work for exercise.      Comorbidities: anxiety, esophageal reflux, sleep apnea using an appliance, and hypertension controlled with dietanemia, hyperglycemia  Problem List[1]    Undecided prior to consult.    5 = Symptoms are incapacitation to do daily activitytaking daily PPI (Protonix 40mg) controlled with PPI     PMH: Medical History[2]     PSH: Surgical History[3]       (-) Denies personal hx of VTE.  (+) Mother \"has a clotting disorder-unknown\"/ family hx of VTE.      FAMILY HISTORY:  Family History[4]     SOCIAL HISTORY:  Social History[5]    MEDICATIONS:  Prior to Admission Medications:  Medication Documentation Review Audit       Reviewed by Francheska Sharpe CMA (Medical Assistant) on 05/16/25 at 0851      Medication Order Taking? Sig Documenting Provider Last Dose Status   ALPRAZolam (Xanax) 0.25 mg tablet [id number] Yes Take 1 tablet (0.25 mg) by mouth once daily as needed for anxiety. Anca Montero MD  " Active   fluticasone (Flonase) 50 mcg/actuation nasal spray [id number] Yes Administer 1 spray into each nostril once daily. Anca Montero MD  Active   NON FORMULARY [id number] Yes New Chapter multiple vitamin daily Historical Provider, MD  Active   pantoprazole (ProtoNix) 40 mg EC tablet [id number] Yes Take 1 tablet (40 mg) by mouth once daily. Anca Montero MD  Active   semaglutide, weight loss, (Wegovy) 0.25 mg/0.5 mL pen injector [id number]  Inject 0.25 mg under the skin 1 (one) time per week for 4 doses. Anca Montero MD   25 2359   triamterene-hydrochlorothiazid (Maxzide-25) 37.5-25 mg tablet [id number]  TAKE 1 TABLET BY MOUTH EVERY DAY   Patient not taking: Reported on 2025    Anca Montero MD  Flag for Review                     ALLERGIES:  Allergies[6]    REVIEW OF SYSTEMS:  GENERAL: Negative for malaise, significant weight loss and fever  HEAD: Negative for headache, swelling.  NECK: Negative for lumps, goiter, pain and significant neck swelling  RESPIRATORY: Negative for cough, wheezing or shortness of breath.  CARDIOVASCULAR: Negative for chest pain, leg swelling or palpitations.  GI: Negative for abdominal discomfort, blood in stools or black stools or change in bowel habits  : No history of dysuria, frequency or incontinence  MUSCULOSKELETAL: Negative for joint pain or swelling, back pain or muscle pain.  SKIN: Negative for lesions, rash, and itching.  PSYCH: Negative for sleep disturbance, mood disorder and recent psychosocial stressors.  ENDOCRINE: Negative for cold or heat intolerance, polyuria, polydipsia and goiter.    Objective   PHYSICAL EXAM:  Visit Vitals  OB Status Ablation   Smoking Status Every Day     General appearance: obese, NAD  Neuro: AOx3  Head: EOMI; no swelling or lesions of scalp or face  ENT:  no gross lesions  Skin: warm, no erythema or rashes  Lungs: clear   Heart: regular rhythm   Abdomen: soft, non-tender,  Extremities: Normal exam of the  extremities. No swelling or pain.  Psych: no hurried speech, no flight of ideas, normal affect    Assessment/Plan   IMPRESSION:  Karmen Sheets is a 44 y.o. female with a BMI of Body mass index is 51.99 kg/m². with the following diagnoses and co-morbidities:     Medical History[7]    This patient does meet the criteria for a surgical weight loss procedure according to NIH guidelines.    The risks of sleeve gastrectomy, Sander-en-Y gastric bypass surgery including but not limited to bleeding, leak along staple lines, infection, dehydration, ulcers, internal hernia, DVT/PE, pneumonia, myocardial infarction, prolonged nausea/vomiting, incomplete resolution of associated medical conditions, reflux, weight regain, vitamin/mineral deficiencies, and death have been explained to the patient and Karmen Sheets has expressed understanding and acceptance of them.     She has significant GERD issues, she is thinking about gastric bypass however she is not completely sure as she originally wanted a simple procedure like sleeve gastrectomy.    Will perform an EGD and discuss it further to make final choice.    We discussed the lifestyle changes necessary to be successful following surgery.    The increased risk of substance and alcohol abuse following bariatric surgery was discussed with the patient, along with the negative consequences of substance/alcohol use after surgery including addiction, worsening of mental health disorders, and injury to the stomach. The risk of smoking and vaping (tobacco or any other substance) after bariatric surgery was explained to the patient. This includes risk of anastamotic ulcers, gastritis, bleeding, perforation, stricture, and PO intolerance.  The patient expressed understanding and acceptance of these risks.    (S/p ablation) The patient was advised not to become pregnant within 12-18 months following bariatric surgery. She was educated on the increased risks to mother and fetus  associated with pregnancy within 2 years of bariatric surgery.    The possible benefits of the above surgeries including weight loss, improvement/resolution of associated medical and mental health conditions, improved mobility, and decreased mortality have been explained the the patient and Karmen Sheets has expressed understanding and acceptance of them.      PLAN:  The plan of treatment for Karmen Sheets is to continue with the consultations and tests ordered today in hopes of qualifying for pre-operative clearance for bariatric surgery. This includes:    Consult Nutrition for education  Consult Psychology  Consult Cardiology  Labs ordered  EGD  PCP for medical optimization  Consult sleep medicine - sleep study 3/4/24 showing Moderate ADRIEL   Recommend at least 15-30 lbs of weight loss prior to surgery.             [1]   Patient Active Problem List  Diagnosis    Anxiety    Allergic rhinitis    Chronic GERD    Morbid obesity with body mass index (BMI) of 50.0 to 59.9 in adult (Multi)    Annual physical exam    Witnessed episode of apnea    ADRIEL (obstructive sleep apnea)    Hyperglycemia    Arthralgia    Essential hypertension   [2]   Past Medical History:  Diagnosis Date    Other conditions influencing health status     HPV test positive    Personal history of diseases of the blood and blood-forming organs and certain disorders involving the immune mechanism 01/21/2019    History of anemia    Personal history of other diseases of the female genital tract 01/21/2019    History of menorrhagia    Personal history of other diseases of the female genital tract     History of abnormal uterine bleeding   [3] No past surgical history on file.  [4]   Family History  Problem Relation Name Age of Onset    Coronary artery disease Mother      COPD Mother      Depression Mother      Other (Malignant Neoplasm of Tonsil) Father      Heart attack Father      Hashimoto's thyroiditis Sister      Leukemia Paternal Grandfather      [5]   Social History  Tobacco Use    Smoking status: Every Day     Types: Cigarettes    Smokeless tobacco: Never   Substance Use Topics    Alcohol use: Yes    Drug use: Never   [6]   Allergies  Allergen Reactions    Zinc Unknown   [7]   Past Medical History:  Diagnosis Date    Other conditions influencing health status     HPV test positive    Personal history of diseases of the blood and blood-forming organs and certain disorders involving the immune mechanism 01/21/2019    History of anemia    Personal history of other diseases of the female genital tract 01/21/2019    History of menorrhagia    Personal history of other diseases of the female genital tract     History of abnormal uterine bleeding      <-- Click to add NO pertinent Past Medical History

## 2025-05-05 NOTE — PATIENT INSTRUCTIONS
The following are some lifestyle changes you should begin to prepare you for your surgery.   Eliminate soda and other carbonated beverages from your diet. Carbonation will not be well tolerated after surgery. Try Propel, Vitamin Water Zero, Sobe Lifewater, Crystal Light or water.    Increase fluid consumption to 64 oz daily. Do not drink within 30 minutes of eating as this will liquefy your food and make you hungry more quickly.    Exercise for 30-60 minutes daily. Brisk walking, bike riding and swimming are all examples of healthy exercise. If you are unable to exercise we recommend seated exercise.    Do not skip meals.    Take a multivitamin daily.    Lose weight. In preparation for your surgery it is important that you begin making healthier food choices now. Our dietitian will meet with you to help you select foods lower in calories and higher in nutrition. We would like you to lose at least 15-30 lbs prior to surgery.     Increase your protein intake to 60 grams per day.    Alcohol is empty calories. Please eliminate while preparing for surgery.    Plan your meals.      General Instruction:   1) Use the information we gave you today to work through your insurance requirements and medical clearances.   2) These documents need to get faxed or emailed to the program navigators so they can submit them for approval from your insurance company.   3) Obtain labs today at a  facility. We will call you with any abnormalities and corrections you need to make.   4) Continue to work with your primary care doctor and other specialist so your other health problems are well controlled prior to your surgery.   5) Adopt the recommendations of the program dietician so you develop healthy eating patterns.   6) Work with the sleep team to get your sleep apnea treated to prevent other health problems .   7) Consider attending a support group to learn from other who have been through the process.   8) Come to the  [facility] sessions.

## 2025-05-08 ENCOUNTER — APPOINTMENT (OUTPATIENT)
Dept: PRIMARY CARE | Facility: CLINIC | Age: 45
End: 2025-05-08
Payer: COMMERCIAL

## 2025-05-12 ENCOUNTER — TELEPHONE (OUTPATIENT)
Dept: SURGERY | Facility: CLINIC | Age: 45
End: 2025-05-12
Payer: COMMERCIAL

## 2025-05-14 NOTE — PROGRESS NOTES
Surgeon: Margarito  Patient is considering:  Gastric bypass    ASSESSMENT:  Current weight:  347.0 Ht:  68.5 in   BMI:   52.9       Initial start weight: 347.0  Pre-Op Excess Body Weight (EBW):   183.0  Target Post-Op weight goal:     228.0-256.0    Food allergies/intolerances:  +allergy to shrimp, does not like seafood and chocolate, +allergy to zinc and iron  Chewing/Swallowing/Dentition:  none  Diarrhea/ Constipation:   none  Exercise level:   no planned exercise, does ADL's, yardwork  Smoking/Tobacco use: 5 cigarettes/day   Vitamins/Minerals supplements:   New Chapter MVI, Bariatric Fusion 45 mg w/C and 5000 Vit D,  Ashwagandha  Past diet attempts:  Low carb, OTCI diet pills, WW  Hours of sleep/night:   8    24 HOUR RECALL/DIET HISTORY:  Breakfast:  homemade egg McMuffin  Snack:    Lunch:  Evans frozen meal   Snack:   Dinner:   gyro and chips  Snack:  none  Beverages:  12 oz reg pop/day,   sometimes juice or lemonade ,  6 oz water/day,  8-24 oz sweet iced tea/day  Alcohol:  none    Person responsible for cooking & shopping?  Pt's  cooks  How often do you eat sweet snacks?      Every night  How often do you eat savory snacks?   Every night  How often do you eat out?    4-5x/week; fast food, restaurants, delivery  Do you feel overly stuffed?  occasionally  Binge Eating?    No but identifies with eating fast, eating too much and eating when not hungry  Night Eating?    no  Emotional Eating?  Yes when bored or stressed out;  occurs daily while at work; go to foods are anything accessible        READINESS TO LEARN:  Motivation to learn: Interested        Understanding of instruction: Good   Anticipated Compliance: Good   Family Support: Unable to assess-family not present          Educational Materials Provided:    Schedule for support group   Calorie meal plan   Goals sheet  Plate method    Nutrition assessment completed today.  Pt will be scheduled for a video education class at a later date to discuss the  2 week pre op diet, post op protein and fluid goals, vitamin and mineral supplementation, exercise goals, and post op diet progression.     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 Pt is  and hs 2 kids; 22 yo daughter and 16 yo son.  Pt brings lunch on 4 out of 5 days per week. She is trying a meal delivery service called [employer]    Pt has had an issue with weight for past 25 years since taking deprovera shot.    Pt struggles with portion size.   eats sweets and junk food almost daily.      Recommend following  1500 calorie meal plan.  Recommend eating 3 meals that include 3-4 oz protein and 2 high protein  snacks. Discussed the plate method.  Reviewed the postop behaviors to start practicing.  Set a goal to exercise 20-30 min 3x/week. Advised to stop drinking SSBs.     Patient was receptive to nutritional recommendations, asked numerous questions, and verbalized understanding of the weight loss surgery diet.  Patient expressed understanding about the importance of strict dietary compliance post-surgery to avoid nutritional deficiencies and achieve optimal weight loss and verbalized intent to follow dietary recommendations.    Malnutrition Screening:  Significant unintentional weight loss? n/a   Eating less than 75% of usual intake for more than 2 weeks? n/a      Nutrition Diagnosis:   1. Overweight/obesity related to excess energy intake as evidenced by BMI = 40 kg/m^2.  2. Food- and nutrition-related knowledge deficit related to lack of prior exposure to surgical weight loss information as evidenced by pt new to surgical program.    Nutrition Interventions:   1. Modify type and amount of food and nutrients within meals and snacks.  2. Comprehensive Nutrition Education    Recommendations:  1. Begin following your meal plan.  Measure and record intake daily.   2. Structure meal patterns, eating three meals and 1-2 snacks per day.  3. Aim for 3-4 oz  protein per meal.  Have 2 high protein snacks that are 10-20 g protein each.  You can try a tuna or chicken packet, Greek yogurt, 2 string cheeses, Protein bars like Quest, Pure Protein, Premier, or Built Bars. you can also try protein chips form Quest or Atkins.    4. Stop drinking sugar sweetened beverages.  Drink 64oz of calorie-free, caffeine-free, and non-carbonated beverages.   5. Practice no drinking 30 minutes before meals, nothing with meals and wait 30 minutes after meals to drink again. Make meals last at least 20-30 minutes-chew thoroughly.   6. Limit or omit eating out/sweets/savory snacks to 1-2 times per week.  7. Begin daily multivitamin.  You can try Centrum Adult tablet.   8. Begin doing exercise of choice for 20-30 min 3x/week. Increase physical activity by 10-15 minutes as tolerated to an end goal of 60 minutes 5 x per week. Consistency is the key.  9. Attend monthly Zoom bariatric support groups.    10.       Stop smoking     Pre-op Goal weight: lose 5% of body weight    Nutrition Monitoring and Evaluation: 1-2 pound weight loss per week  Criteria: weight check  Need for Follow-up:     Patient does meet National [employer] Health guidelines for weight loss surgery, however needs to demonstrate consistent effort in making dietary changes before giving clearance. It is anticipated that the patient will need at least 3 nutritional follow-up visits prior to clearance for surgery.      Sandee Munoz RD, LD, [facility]  [phone]

## 2025-05-16 ENCOUNTER — NUTRITION (OUTPATIENT)
Dept: SURGERY | Facility: CLINIC | Age: 45
End: 2025-05-16
Payer: COMMERCIAL

## 2025-05-16 ENCOUNTER — OFFICE VISIT (OUTPATIENT)
Dept: SURGERY | Facility: CLINIC | Age: 45
End: 2025-05-16
Payer: COMMERCIAL

## 2025-05-16 VITALS
BODY MASS INDEX: 43.4 KG/M2 | OXYGEN SATURATION: 97 % | HEIGHT: 69 IN | WEIGHT: 293 LBS | DIASTOLIC BLOOD PRESSURE: 75 MMHG | SYSTOLIC BLOOD PRESSURE: 113 MMHG | HEART RATE: 93 BPM

## 2025-05-16 DIAGNOSIS — I10 ESSENTIAL HYPERTENSION: ICD-10-CM

## 2025-05-16 DIAGNOSIS — Z12.11 COLON CANCER SCREENING: Primary | ICD-10-CM

## 2025-05-16 DIAGNOSIS — Z01.812 PRE-OPERATIVE LABORATORY EXAMINATION: ICD-10-CM

## 2025-05-16 DIAGNOSIS — G47.33 OSA (OBSTRUCTIVE SLEEP APNEA): ICD-10-CM

## 2025-05-16 DIAGNOSIS — K21.9 CHRONIC GERD: ICD-10-CM

## 2025-05-16 DIAGNOSIS — R73.9 HYPERGLYCEMIA: ICD-10-CM

## 2025-05-16 DIAGNOSIS — Z13.21 ENCOUNTER FOR SCREENING FOR NUTRITIONAL DISORDER: ICD-10-CM

## 2025-05-16 DIAGNOSIS — E66.01 OBESITY, MORBID, BMI 40.0-49.9 (MULTI): ICD-10-CM

## 2025-05-16 DIAGNOSIS — E66.01 MORBID OBESITY WITH BODY MASS INDEX (BMI) OF 50.0 TO 59.9 IN ADULT (MULTI): Primary | ICD-10-CM

## 2025-05-16 DIAGNOSIS — Z71.3 ENCOUNTER FOR NUTRITION EVALUATION PRIOR TO BARIATRIC SURGERY: ICD-10-CM

## 2025-05-16 DIAGNOSIS — M25.50 ARTHRALGIA, UNSPECIFIED JOINT: ICD-10-CM

## 2025-05-16 DIAGNOSIS — Z13.21 ENCOUNTER FOR VITAMIN DEFICIENCY SCREENING: ICD-10-CM

## 2025-05-16 DIAGNOSIS — Z98.84 BARIATRIC SURGERY STATUS: ICD-10-CM

## 2025-05-16 PROCEDURE — 99215 OFFICE O/P EST HI 40 MIN: CPT | Performed by: SURGERY

## 2025-05-16 PROCEDURE — 3078F DIAST BP <80 MM HG: CPT | Performed by: SURGERY

## 2025-05-16 PROCEDURE — 3008F BODY MASS INDEX DOCD: CPT | Performed by: SURGERY

## 2025-05-16 PROCEDURE — 3074F SYST BP LT 130 MM HG: CPT | Performed by: SURGERY

## 2025-05-16 PROCEDURE — 4004F PT TOBACCO SCREEN RCVD TLK: CPT | Performed by: SURGERY

## 2025-05-16 PROCEDURE — 99205 OFFICE O/P NEW HI 60 MIN: CPT | Performed by: SURGERY

## 2025-05-16 ASSESSMENT — PATIENT HEALTH QUESTIONNAIRE - PHQ9
2. FEELING DOWN, DEPRESSED OR HOPELESS: NOT AT ALL
1. LITTLE INTEREST OR PLEASURE IN DOING THINGS: NOT AT ALL
SUM OF ALL RESPONSES TO PHQ9 QUESTIONS 1 AND 2: 0

## 2025-05-18 LAB
25(OH)D3+25(OH)D2 SERPL-MCNC: 46 NG/ML (ref 30–100)
ALBUMIN SERPL-MCNC: 4 G/DL (ref 3.6–5.1)
ALP SERPL-CCNC: 82 U/L (ref 31–125)
ALT SERPL-CCNC: 17 U/L (ref 6–29)
ANION GAP SERPL CALCULATED.4IONS-SCNC: 8 MMOL/L (CALC) (ref 7–17)
APTT PPP: 31 SEC (ref 23–32)
AST SERPL-CCNC: 13 U/L (ref 10–35)
BASOPHILS # BLD AUTO: 38 CELLS/UL (ref 0–200)
BASOPHILS NFR BLD AUTO: 0.5 %
BILIRUB SERPL-MCNC: 0.4 MG/DL (ref 0.2–1.2)
BUN SERPL-MCNC: 20 MG/DL (ref 7–25)
C PEPTIDE SERPL-MCNC: 4.32 NG/ML (ref 0.8–3.85)
CALCIUM SERPL-MCNC: 9.1 MG/DL (ref 8.6–10.2)
CHLORIDE SERPL-SCNC: 106 MMOL/L (ref 98–110)
CHOLEST SERPL-MCNC: 149 MG/DL
CHOLEST/HDLC SERPL: 3.2 (CALC)
CO2 SERPL-SCNC: 24 MMOL/L (ref 20–32)
COPPER BLD-MCNC: NORMAL UG/DL
COTININE SERPL-MCNC: NORMAL NG/ML
CREAT SERPL-MCNC: 0.71 MG/DL (ref 0.5–0.99)
EGFRCR SERPLBLD CKD-EPI 2021: 106 ML/MIN/1.73M2
EOSINOPHIL # BLD AUTO: 160 CELLS/UL (ref 15–500)
EOSINOPHIL NFR BLD AUTO: 2.1 %
ERYTHROCYTE [DISTWIDTH] IN BLOOD BY AUTOMATED COUNT: 13.1 % (ref 11–15)
EST. AVERAGE GLUCOSE BLD GHB EST-MCNC: 123 MG/DL
EST. AVERAGE GLUCOSE BLD GHB EST-SCNC: 6.8 MMOL/L
FERRITIN SERPL-MCNC: 57 NG/ML (ref 16–232)
FOLATE SERPL-MCNC: 15.1 NG/ML
GLUCOSE SERPL-MCNC: 123 MG/DL (ref 65–99)
HBA1C MFR BLD: 5.9 %
HCT VFR BLD AUTO: 41.2 % (ref 35–45)
HDLC SERPL-MCNC: 47 MG/DL
HGB BLD-MCNC: 13.8 G/DL (ref 11.7–15.5)
INR PPP: 0.9
IRON SATN MFR SERPL: 19 % (CALC) (ref 16–45)
IRON SERPL-MCNC: 58 MCG/DL (ref 40–190)
LDLC SERPL CALC-MCNC: 87 MG/DL (CALC)
LYMPHOCYTES # BLD AUTO: 1672 CELLS/UL (ref 850–3900)
LYMPHOCYTES NFR BLD AUTO: 22 %
MCH RBC QN AUTO: 29.6 PG (ref 27–33)
MCHC RBC AUTO-ENTMCNC: 33.5 G/DL (ref 32–36)
MCV RBC AUTO: 88.4 FL (ref 80–100)
MONOCYTES # BLD AUTO: 441 CELLS/UL (ref 200–950)
MONOCYTES NFR BLD AUTO: 5.8 %
NEUTROPHILS # BLD AUTO: 5290 CELLS/UL (ref 1500–7800)
NEUTROPHILS NFR BLD AUTO: 69.6 %
NICOTINE SERPL-MCNC: NORMAL NG/ML
NONHDLC SERPL-MCNC: 102 MG/DL (CALC)
PLATELET # BLD AUTO: 272 THOUSAND/UL (ref 140–400)
PMV BLD REES-ECKER: 9.6 FL (ref 7.5–12.5)
POTASSIUM SERPL-SCNC: 4.5 MMOL/L (ref 3.5–5.3)
PROT SERPL-MCNC: 6.8 G/DL (ref 6.1–8.1)
PROTHROMBIN TIME: 9.8 SEC (ref 9–11.5)
PTH-INTACT SERPL-MCNC: NORMAL PG/ML
RBC # BLD AUTO: 4.66 MILLION/UL (ref 3.8–5.1)
SODIUM SERPL-SCNC: 138 MMOL/L (ref 135–146)
T4 FREE SERPL-MCNC: 1.3 NG/DL (ref 0.8–1.8)
TIBC SERPL-MCNC: 299 MCG/DL (CALC) (ref 250–450)
TRIGL SERPL-MCNC: 61 MG/DL
TSH SERPL-ACNC: 1.52 MIU/L
VIT A SERPL-MCNC: NORMAL UG/ML
VIT B1 BLD-SCNC: NORMAL NMOL/L
VIT B12 SERPL-MCNC: 293 PG/ML (ref 200–1100)
WBC # BLD AUTO: 7.6 THOUSAND/UL (ref 3.8–10.8)
ZINC SERPL-MCNC: NORMAL UG/ML

## 2025-05-19 RX ORDER — SODIUM, POTASSIUM,MAG SULFATES 17.5-3.13G
SOLUTION, RECONSTITUTED, ORAL ORAL
Qty: 354 ML | Refills: 0 | Status: SHIPPED | OUTPATIENT
Start: 2025-05-19

## 2025-05-23 LAB
25(OH)D3+25(OH)D2 SERPL-MCNC: 46 NG/ML (ref 30–100)
ALBUMIN SERPL-MCNC: 4 G/DL (ref 3.6–5.1)
ALP SERPL-CCNC: 82 U/L (ref 31–125)
ALT SERPL-CCNC: 17 U/L (ref 6–29)
ANION GAP SERPL CALCULATED.4IONS-SCNC: 8 MMOL/L (CALC) (ref 7–17)
APTT PPP: 31 SEC (ref 23–32)
AST SERPL-CCNC: 13 U/L (ref 10–35)
BASOPHILS # BLD AUTO: 38 CELLS/UL (ref 0–200)
BASOPHILS NFR BLD AUTO: 0.5 %
BILIRUB SERPL-MCNC: 0.4 MG/DL (ref 0.2–1.2)
BUN SERPL-MCNC: 20 MG/DL (ref 7–25)
C PEPTIDE SERPL-MCNC: 4.32 NG/ML (ref 0.8–3.85)
CALCIUM SERPL-MCNC: 9.1 MG/DL (ref 8.6–10.2)
CHLORIDE SERPL-SCNC: 106 MMOL/L (ref 98–110)
CHOLEST SERPL-MCNC: 149 MG/DL
CHOLEST/HDLC SERPL: 3.2 (CALC)
CO2 SERPL-SCNC: 24 MMOL/L (ref 20–32)
COPPER BLD-MCNC: 95 MCG/DL
COTININE SERPL-MCNC: 157 NG/ML
CREAT SERPL-MCNC: 0.71 MG/DL (ref 0.5–0.99)
EGFRCR SERPLBLD CKD-EPI 2021: 106 ML/MIN/1.73M2
EOSINOPHIL # BLD AUTO: 160 CELLS/UL (ref 15–500)
EOSINOPHIL NFR BLD AUTO: 2.1 %
ERYTHROCYTE [DISTWIDTH] IN BLOOD BY AUTOMATED COUNT: 13.1 % (ref 11–15)
EST. AVERAGE GLUCOSE BLD GHB EST-MCNC: 123 MG/DL
EST. AVERAGE GLUCOSE BLD GHB EST-SCNC: 6.8 MMOL/L
FERRITIN SERPL-MCNC: 57 NG/ML (ref 16–232)
FOLATE SERPL-MCNC: 15.1 NG/ML
GLUCOSE SERPL-MCNC: 123 MG/DL (ref 65–99)
HBA1C MFR BLD: 5.9 %
HCT VFR BLD AUTO: 41.2 % (ref 35–45)
HDLC SERPL-MCNC: 47 MG/DL
HGB BLD-MCNC: 13.8 G/DL (ref 11.7–15.5)
INR PPP: 0.9
IRON SATN MFR SERPL: 19 % (CALC) (ref 16–45)
IRON SERPL-MCNC: 58 MCG/DL (ref 40–190)
LDLC SERPL CALC-MCNC: 87 MG/DL (CALC)
LYMPHOCYTES # BLD AUTO: 1672 CELLS/UL (ref 850–3900)
LYMPHOCYTES NFR BLD AUTO: 22 %
MCH RBC QN AUTO: 29.6 PG (ref 27–33)
MCHC RBC AUTO-ENTMCNC: 33.5 G/DL (ref 32–36)
MCV RBC AUTO: 88.4 FL (ref 80–100)
MONOCYTES # BLD AUTO: 441 CELLS/UL (ref 200–950)
MONOCYTES NFR BLD AUTO: 5.8 %
NEUTROPHILS # BLD AUTO: 5290 CELLS/UL (ref 1500–7800)
NEUTROPHILS NFR BLD AUTO: 69.6 %
NICOTINE SERPL-MCNC: 3 NG/ML
NONHDLC SERPL-MCNC: 102 MG/DL (CALC)
PLATELET # BLD AUTO: 272 THOUSAND/UL (ref 140–400)
PMV BLD REES-ECKER: 9.6 FL (ref 7.5–12.5)
POTASSIUM SERPL-SCNC: 4.5 MMOL/L (ref 3.5–5.3)
PROT SERPL-MCNC: 6.8 G/DL (ref 6.1–8.1)
PROTHROMBIN TIME: 9.8 SEC (ref 9–11.5)
PTH-INTACT SERPL-MCNC: 40 PG/ML (ref 16–77)
RBC # BLD AUTO: 4.66 MILLION/UL (ref 3.8–5.1)
SODIUM SERPL-SCNC: 138 MMOL/L (ref 135–146)
T4 FREE SERPL-MCNC: 1.3 NG/DL (ref 0.8–1.8)
TIBC SERPL-MCNC: 299 MCG/DL (CALC) (ref 250–450)
TRIGL SERPL-MCNC: 61 MG/DL
TSH SERPL-ACNC: 1.52 MIU/L
VIT A SERPL-MCNC: 43 MCG/DL (ref 38–98)
VIT B1 BLD-SCNC: 162 NMOL/L (ref 78–185)
VIT B12 SERPL-MCNC: 293 PG/ML (ref 200–1100)
WBC # BLD AUTO: 7.6 THOUSAND/UL (ref 3.8–10.8)
ZINC SERPL-MCNC: 80 MCG/DL (ref 60–130)

## 2025-06-17 ENCOUNTER — APPOINTMENT (OUTPATIENT)
Dept: SURGERY | Facility: CLINIC | Age: 45
End: 2025-06-17
Payer: COMMERCIAL

## 2025-06-20 ENCOUNTER — TELEPHONE (OUTPATIENT)
Dept: SURGERY | Facility: CLINIC | Age: 45
End: 2025-06-20
Payer: COMMERCIAL

## 2025-07-07 NOTE — PROGRESS NOTES
Patient: Karmen Sheets  : 1980 AGE: 44 y.o. SEX:female   MRN: [id number]   Provider: FLORECITA Allen     Location [facility]   Service Date: 7/15/2025     PCP: Anca Montero MD   Referred by: Anca Montero MD          [facility]  New Visit Note    Virtual or Telephone Consent  An interactive audio and video telecommunication system which permits real time communications between the patient (at the originating site) and provider (at the distant site) was utilized to provide this telehealth service.   Verbal consent was requested and obtained from Karmen Sheets on this date, 07/15/25 for a telehealth visit and the patient's location was confirmed at the time of the visit.     HISTORY OF PRESENT ILLNESS     Karmen Sheets is a 44 y.o. female with a h/o Hypertension, Obesity, ADRIEL, Anxiety, and GERD, allergic rhinitis  who presents to [facility].    7/15/25: NPV with concerns of ADRIEL management. Patient was diagnosed with ADRIEL in  via HSAT and was started on CPAP since then. Currently on auto-CPAP 5-20 cm H2O with EPR/Flex 1 and P10 medium mask through [employer]. Patient has been using machine every night. Patient denies machine problems, mask leak, air hunger, aerophagia, dry mouth, skin irritation, and nasal congestion. The following are patient's perceived benefits of PAP: decreased nocturnal awakening and better quality of sleep. Falling asleep faster. Cleaning equipment regularly. ---> supplies renewed       SLEEP STUDY HISTORY (personally reviewed raw data such as interpretation report, data sheet, hypnogram, and titration table if available and applicable)  -HSAT 3/1/2024: moderate ADRIEL with TRISH 3% 17.5, SpO2 bruno 74% SpO2 <=88% for 22min    SLEEP-WAKE SCHEDULE    Sleep Patterns: In terms of the patient's sleep/wake cycle, she generally gets into bed at approximately 10 PM.  her latency  to sleep onset after lights out is 30-60min. During the night, the patient generally awakens 1-2 times nightly. These awakenings are usually brief in duration. Final wake time on weekday mornings is around 6:30 AM.    Compared to weekdays, the patient's sleep schedule is  similar on the weekends. Wake time 7-8AM.     Breathing during sleep: snoring and nasal congestion  Behaviors at night: No   Sleep paralysis: No   Hypnogogic or hypnopompic hallucinations: No   Cataplexy: No     RLS screen: Patient denies RLS symptoms.    Daytime Symptoms:  On awakening patient reports: wake unrefreshed  Patient report some daytime symptoms including: DAYTIME SYMPTOMS: reports sleep inertia    Sleep environment:  Preferred sleep position: back (since being on CPAP)  Room is dark: Yes  Room is quiet: Yes  Room is cool: Yes  Bed comfort: good    SLEEP HABITS  Caffeine consumption: Yes, Patient consumes caffeine beverage regularly, about 1 cup(s)/day.  Alcohol consumption: Yes, Patient consumes alcohol only on special occasions.  Smoking: Yes, 3-4 cigarettes/day   Marijuana: No  Sleep aids: denies     WEIGHT: stable    ESS: 10  RIKKI: 14    REVIEW OF SYSTEMS     All other systems have been reviewed and are negative.    ALLERGIES     Allergies[1]    MEDICATIONS     Current Medications[2]    PAST HISTORIES     PERTINENT PAST MEDICAL HISTORY: See HPI    PERTINENT PAST SURGICAL HISTORY for Sleep Medicine:  non-contributory    PERTINENT FAMILY HISTORY for Sleep Medicine:  sleep apnea- mother and sister     PERTINENT SOCIAL HISTORY:  She  reports that she has been smoking cigarettes. She started smoking about 30 years ago. She has a 7.6 pack-year smoking history. She has never used smokeless tobacco. She reports current alcohol use. She reports that she does not use drugs.     Active Problems, Allergy List, Medication List, and PMH/PSH/FH/Social Hx have been reviewed and reconciled in chart. No significant changes unless documented in the  pertinent chart section. Updates made when necessary.     PHYSICAL EXAM     VITAL SIGNS: There were no vitals taken for this visit.    CURRENT WEIGHT: There were no vitals filed for this visit.     PREVIOUS WEIGHTS:  Wt Readings from Last 3 Encounters:   05/16/25 (!) 157 kg (347 lb)   04/03/25 (!) 158 kg (348 lb)   03/11/25 (!) 158 kg (348 lb 6.4 oz)     Limited telehealth examination  PHYSICAL EXAMINATION  General appearance: awake alert in NAD  Affect: normal  HEENT: Nasal congestion absent  Lungs: no cough.  Neuro: normal speech      RESULTS/DATA     IRON, TOTAL (mcg/dL)   Date Value   05/17/2025 58     % SATURATION (% (calc))   Date Value   05/17/2025 19     IRON BINDING CAPACITY (mcg/dL (calc))   Date Value   05/17/2025 299     FERRITIN (ng/mL)   Date Value   05/17/2025 57       CARBON DIOXIDE   Date Value Ref Range Status   05/17/2025 24 20 - 32 mmol/L Final       ASSESSMENT/PLAN     Ms. Sheets is a 44 y.o. female and She was referred to the [facility] for evaluation of ADRIEL    Problem List, Orders, Assessment, Recommendations:    # ADRIEL, moderate    -HSAT 3/1/2024: moderate ADRIEL with TRISH 3% 17.5, SpO2 bruno 74% SpO2 <=88% for 22min  - Personally reviewed the sleep study's raw data such as interpretation report, data sheet, and hypnogram. Discussed sleep study results with patient today  - Retrieved and personally reviewed recent PAP adherence download data today. See HPI.  - excellent compliance to PAP therapy, residual AHI at goal, and good control of ADRIEL symptoms  - continue current setting 5-20 CWP  - renew PAP supply orders, order placed to DME- [facility]  - diet, exercise, and weight loss were emphasized today in clinic, as were non-supine sleep, avoiding alcohol in the late evening, and driving or operating heavy machinery when sleepy. Patient verbalized understanding    # ALLERGIC RHINITIS/ ALLERGIES  - Continue fluticasone nasal spray 2 sprays per nostril once daily 1 hour  before bedtime.    #HTN  BP Readings from Last 1 Encounters:   05/16/25 113/75     - doing well, asymptomatic, denies any headache, blurry vision, chest pain, palpitation, dizziness, lightheadedness, or syncopal episodes  - discussed at length the impact of untreated ADRIEL and BP control  - supportive management: low salt DASH diet (less than 2000 mg sodium intake daily), moderate intensity aerobic exercise at least 30 minutes 5 days per week, reduce stress, quit smoking, limit alcohol, lose weight, and monitor BP once daily  - continue current management and follow-up with PCP     #Morbid Obesity  BMI Readings from Last 1 Encounters:   05/16/25 51.99 kg/m²     - Encouraged healthy weight loss via diet and exercise  - Weight loss can help in the long term treatment of ADRIEL.  - Defer management to PCP, seen bariatrics but had bad experience and decided against surgery. her insurance has denied all GLP-1s    #Tobacco Use Disorder  - Smokes 3-4 cigarettes/day  - Smoking cessation encouraged    All of patient's questions were answered. She verbalizes understanding and agreement with my assessment and plan.    Disposition    Return to clinic in 12 months or sooner if needed            [1]   Allergies  Allergen Reactions    Zinc Unknown   [2]   Current Outpatient Medications   Medication Sig Dispense Refill    ASHWAGANDHA EXTRACT ORAL Take 1 mL by mouth once daily.      fluticasone (Flonase) 50 mcg/actuation nasal spray Administer 1 spray into each nostril once daily. 16 g 3    NON FORMULARY [place] multiple vitamin daily      pantoprazole (ProtoNix) 40 mg EC tablet Take 1 tablet (40 mg) by mouth once daily. 90 tablet 3    sodium,potassium,mag sulfates (Suprep) 17.5-3.13-1.6 gram solution Take one bottle twice as directed by the prep instructions 354 mL 0    triamterene-hydrochlorothiazid (Maxzide-25) 37.5-25 mg tablet TAKE 1 TABLET BY MOUTH EVERY DAY 90 tablet 1    ALPRAZolam (Xanax) 0.25 mg tablet Take 1 tablet (0.25  mg) by mouth once daily as needed for anxiety. 90 tablet 0    semaglutide, weight loss, (Wegovy) 0.25 mg/0.5 mL pen injector Inject 0.25 mg under the skin 1 (one) time per week for 4 doses. 2 mL 0     No current facility-administered medications for this visit.

## 2025-07-15 ENCOUNTER — APPOINTMENT (OUTPATIENT)
Facility: CLINIC | Age: 45
End: 2025-07-15
Payer: COMMERCIAL

## 2025-07-15 DIAGNOSIS — G47.33 OSA (OBSTRUCTIVE SLEEP APNEA): Primary | ICD-10-CM

## 2025-07-15 DIAGNOSIS — I10 ESSENTIAL HYPERTENSION: ICD-10-CM

## 2025-07-15 DIAGNOSIS — E66.01 MORBID OBESITY WITH BODY MASS INDEX (BMI) OF 50.0 TO 59.9 IN ADULT (MULTI): ICD-10-CM

## 2025-07-15 DIAGNOSIS — F17.210 CIGARETTE SMOKER: ICD-10-CM

## 2025-07-15 DIAGNOSIS — J30.9 ALLERGIC RHINITIS, UNSPECIFIED SEASONALITY, UNSPECIFIED TRIGGER: ICD-10-CM

## 2025-07-15 PROCEDURE — 99214 OFFICE O/P EST MOD 30 MIN: CPT | Performed by: NURSE PRACTITIONER

## 2025-07-15 PROCEDURE — 4004F PT TOBACCO SCREEN RCVD TLK: CPT | Performed by: NURSE PRACTITIONER

## 2025-07-15 PROCEDURE — G8433 SCR FOR DEP NOT CPT DOC RSN: HCPCS | Performed by: NURSE PRACTITIONER

## 2025-07-15 ASSESSMENT — SLEEP AND FATIGUE QUESTIONNAIRES
HOW LIKELY ARE YOU TO NOD OFF OR FALL ASLEEP WHILE SITTING AND TALKING TO SOMEONE: SLIGHT CHANCE OF DOZING
HOW LIKELY ARE YOU TO NOD OFF OR FALL ASLEEP WHEN YOU ARE A PASSENGER IN A CAR FOR AN HOUR WITHOUT A BREAK: SLIGHT CHANCE OF DOZING
ESS-CHAD TOTAL SCORE: 10
SLEEP_PROBLEM_INTERFERES_DAILY_ACTIVITIES: A LITTLE
WAKING_TOO_EARLY: SEVERE
HOW LIKELY ARE YOU TO NOD OFF OR FALL ASLEEP WHILE SITTING AND READING: MODERATE CHANCE OF DOZING
HOW LIKELY ARE YOU TO NOD OFF OR FALL ASLEEP WHILE WATCHING TV: SLIGHT CHANCE OF DOZING
SITING INACTIVE IN A PUBLIC PLACE LIKE A CLASS ROOM OR A MOVIE THEATER: MODERATE CHANCE OF DOZING
SATISFACTION_WITH_CURRENT_SLEEP_PATTERN: DISSATISFIED
HOW LIKELY ARE YOU TO NOD OFF OR FALL ASLEEP WHILE SITTING QUIETLY AFTER LUNCH WITHOUT ALCOHOL: SLIGHT CHANCE OF DOZING
SLEEP_PROBLEM_NOTICEABLE_TO_OTHERS: SOMEWHAT
HOW LIKELY ARE YOU TO NOD OFF OR FALL ASLEEP WHILE LYING DOWN TO REST IN THE AFTERNOON WHEN CIRCUMSTANCES PERMIT: MODERATE CHANCE OF DOZING
DIFFICULTY_FALLING_ASLEEP: MODERATE
DIFFICULTY_STAYING_ASLEEP: MODERATE
WORRIED_DISTRESSED_DUE_TO_SLEEP: A LITTLE
HOW LIKELY ARE YOU TO NOD OFF OR FALL ASLEEP IN A CAR, WHILE STOPPED FOR A FEW MINUTES IN TRAFFIC: WOULD NEVER DOZE

## 2025-07-15 ASSESSMENT — COLUMBIA-SUICIDE SEVERITY RATING SCALE - C-SSRS
2. HAVE YOU ACTUALLY HAD ANY THOUGHTS OF KILLING YOURSELF?: NO
1. IN THE PAST MONTH, HAVE YOU WISHED YOU WERE DEAD OR WISHED YOU COULD GO TO SLEEP AND NOT WAKE UP?: NO
6. HAVE YOU EVER DONE ANYTHING, STARTED TO DO ANYTHING, OR PREPARED TO DO ANYTHING TO END YOUR LIFE?: NO

## 2025-07-15 ASSESSMENT — ENCOUNTER SYMPTOMS
LOSS OF SENSATION IN FEET: 0
OCCASIONAL FEELINGS OF UNSTEADINESS: 0
DEPRESSION: 0

## 2025-07-15 NOTE — PATIENT INSTRUCTIONS
[facility]  DO [address] [place] [address] [place] [address]       NAME: Karmen Sheets   DATE: 07/15/25    Your Sleep Provider Today: FLORECITA Allen  Your Primary Care Physician: Anca Montero MD       Thank you for coming to the Sleep Medicine Clinic today! Your sleep medicine provider today was: FLORECITA Allen Below is a summary of your treatment plan, other important information, and our contact numbers:      TREATMENT PLAN     - Follow-up in 12 months.  - If not already done, sign up for 'My Chart' and send prescription requests or messages through this      Annual Reminders About Your Sleep Apnea    Your sleep apnea is well controlled based on reviewing your PAP Data Report. A supply renewal prescription has been sent to your DME company.     General Reminders:  Continue current machine settings. Continue using machine every night. Need at least 4 hours daily usage.   Remember to clean your mask, tubings, and water chamber regularly as instructed.  Know the replacement schedule of your supplies/ accessories and contact your DME (durable medical equipment) provider if you are due for them.  Avoid driving or operating heavy machinery when drowsy. A person driving while sleepy is 5 times more likely to have an accident. If you feel sleepy, pull over and take a short power nap (sleep for less than 30 minutes). Otherwise, ask somebody to drive you.    Follow-up sooner through [facility] or calling our office if you have any of the following symptoms:  Snoring or stopping breathing while using the machine  Recurrence of fatigue, sleepiness, insomnia, and other symptoms you had prior using machine  Persistent or worsening fatigue or sleepiness despite regular use of machine  Issues tolerating the machine like bloating sensation, air hunger, too much hot air, too much pressure, taking off mask without recall in the  middle of sleep, etc.     Other conservative measures to improve sleep apnea:  Losing weight can lead to some improvement of ADRIEL which means you will need lower pressures in machine to control your ADRIEL. In some patients, they don't need to use the machine after bariatric surgery. Hence, consider medical and/or surgical weight loss especially if your BMI is more than 35.  Avoiding alcohol, sedative-hypnotics, and sedating medications is imperative as these substances can worsen snoring and sleep apnea  If you have nasal congestion or seasonal allergies, improving your breathing through the nose is critical for treating sleep apnea, tolerating CPAP, and improving your sleep; hence, using intranasal steroid spray like Flonase might help. Make sure you know the proper way to use it.  Stay off your back when sleeping.    Common issues with PAP machine:  Mask gets dislodged when turning to the side: Consider getting a CPAP pillow or switching to a mask with hose on top.     Dry mouth:  Your machine has built-in humidifier that heats up the air to prevent dry mouth. It can be adjusted to your comfort. You can try that first and increase setting one level one night at a time to check which setting is comfortable and effective in lessening dry mouth. If dry mouth persists despite humidity setting adjustment, may apply OTC Biotene gel over the gums at bedtime.  If Biotene gel is not effective, consider trying XEROSTOM gel from [employer].  Also, eliminate or reduce dose of meds that can cause dry mouth if possible. Lastly, may try getting a separate room humidifier machine.    Airleaks: Please call DME as they may need to adjust your mask or refit you with a different kind of mask. In addition, you can ask DME for tips on getting a good mask seal and mask fit.     Difficulty tolerating the mask: Contact your DME to try a different kind of mask and/or call office to get a referral to Sleep Psychologist for CPAP  "desensitization. CPAP desensitization technique is a set of strategies that helps patient cope with claustrophobia and anxiety related to wearing mask. Alternatively, we can do a daytime mini-sleep study called PAP-nap trial wherein you will try on different kinds of mask and the sleep technician will try different pressure settings on CPAP and BPAP machines to see which specific pressure is tolerable and comfortable for you.     Water droplets or moisture within the hose and/or mask: This is called rain-out and it is caused by condensation of too much heated humidity on the cooler walls of the hose. If you have rain-out, turn down humidity settings or get a heated hose. If you already have a heated hose, turn up the \"tube temperature\" of the heated hose. Alternatively, if you don't want to get a heated hose or warmer air, may wrap the CPAP hose with stockings to keep it somewhat warm. Also, you need to place the machine on the floor and lower the hose so that water won't travel upward towards your mask.       Maintaining your CPAP/BPAP device:    The humidification chamber (aka water tank or water chamber) needs to be filled with distilled water to prevent buildup of white deposits in the future. If you cannot find distilled water, you can use tap water but expect to have white deposits buildup seen after prolonged use with tap water. If you start seeing white deposits on the water chamber, you can clean it by filling it with equal parts of distilled white vinegar and water. Let the vinegar-water mixture sit for 2 hours, and then rinse it with running tap water. Clean with soap and water then let it dry.     You should try to keep your machine clean in order to work well. Here are some tips to clean PAP supplies / accessories:    Clean the humidification chamber (aka water tank) as well as your mask and tubing at least once a week with soap and water.   Alternatively, you can fill a sink or basin with warm water " and add a little mild detergent, like Ivory dish soap. Gently wipe your supplies with the soapy water to free all the oils and dirt that may have collected. Once that's done, rinse these items with clean water until the soap is gone and let them air dry. You can hang your tubing over the curtain john in your bathroom so that it dries.  The mask insert (part of the mask that has contact with your skin) needs to be cleaned with soap and water daily. Another option is to wipe them down with CPAP wipes or baby wipes.    You should replace your mask and tubing frequently in order to prevent bacteria buildup, machine damage, and mask seal issues. The older the mask and hose, the high likelihood that there is bacteria buildup in it especially if they are not cleaned regularly. Dirty filters damage machines because build-up of dust and contaminants can cause machine to over-heat, and in time, damage the motor of machine. Cushions lose their seal over time as most masks are made of plastic and silicone while headgear is made of neoprene. These materials will break down with age and frequent use. Here is the recommended replacement schedule for PAP supplies / accessories:    Twice a month- disposable filters and cushions for nasal mask or nasal pillows.  Once a month- cushion for full face mask  Every 3 months- mask with headgear and PAP tubing (standard or heated hose)  Every 6 months- reusable filter, water chamber, and chin strap     Other useful information:    Some people do not put water in the tank while other people prefers to put water in the tank to prevent mouth dryness. Try to experiment to determine which is more comfortable for you.   In general, new machines have 2 years warranty on parts while health insurance allows you to have a new machine once every 5 years.      IMPORTANT INFORMATION     Call 911 for medical emergencies.  Our offices are generally open from Monday-Friday, 9 am - 5 pm.  If you need to get  "in touch with me, you may either call me/my team (number is below) or you can use [employer].  If a referral for a test, for CPAP, or for another specialist was made, and you have not heard about scheduling this within a week, please call scheduling at [phone] ([phone]).  If you are unable to make your appointment for clinic or an overnight study, kindly call the office at least 48 hours in advance to cancel and reschedule.  There are no supporting services by either the sleep doctors or their staff on weekends and Holidays, or after 5 PM on weekdays.     PRESCRIPTIONS     We require 7 days advanced notice for prescription refills. If we do not receive the request in this time, we cannot guarantee that your medication will be refilled in time.    IMPORTANT PHONE NUMBERS     [facility]: [phone]  [facility] (DME): [phone]  [employer] (DME): [phone]  [facility] (DME): [phone]    CONTACTING YOUR SLEEP MEDICINE PROVIDER AND SLEEP TEAM      For issues with your machine or mask interface, please call your DME provider first. DME stands for durable medical company. DME is the company who provides you the machine and/or PAP supplies / accessories.   To schedule, cancel, or reschedule SLEEP STUDY APPOINTMENTS, please call the Main Phone Line at [phone] - option 3.   To schedule, cancel, or reschedule CLINIC APPOINTMENTS, you can do it in \"[employer]\", call [phone] for [facility] office to speak with my on site staff, or call the Main Phone Line at [phone] - option 2  For CLINICAL QUESTIONS or MEDICATION REFILLS, please call direct line for Adult Sleep Nurses at [phone].   Lastly, you can also send a message directly to your provider through \"My Chart\", which is the email service through your  Records Account: [email or web address]     Adult Sleep Nurses (Dunia Segal RN and Cindi Dorado RN):  For clinical questions and " refilling prescriptions: [phone]  Email sleep diaries and other documents at: [email or web address]    Office locations for Rosalind Juan NP:    [facility]   [address]  [place] [address] [phone]    [facility]  [address]  [address]  [place], [place] [address] [phone]    OUR SLEEP TESTING LOCATIONS     Our team will contact you to schedule your sleep study, however, you can contact us as follow:  Main Phone Line (scheduling only): [phone]), option 3    Sleep Testing Locations:   [place] (18 years and older): [address] [place] [address], 2nd floor   [place] (18 years and older): [address]; 4th floor  After hours line: [phone]   [place] (18 years and older) at [place]: [address] [place] [address]  After hours line: [phone]   [facility] (Main campus: All ages): [facility], 6th floor. After hours line: [phone]   [place] (5 years and older; younger considered on case-by-case basis): [address] [place] [address]; [address], [address]. Scheduling  After hours line: [phone]       Here at [facility], we wish you a restful sleep!

## 2025-07-25 ENCOUNTER — TELEPHONE (OUTPATIENT)
Dept: SURGERY | Facility: CLINIC | Age: 45
End: 2025-07-25
Payer: COMMERCIAL

## 2025-07-25 DIAGNOSIS — Z12.11 SCREENING FOR COLON CANCER: Primary | ICD-10-CM

## 2025-07-25 DIAGNOSIS — K21.9 CHRONIC GERD: ICD-10-CM

## 2025-07-25 NOTE — TELEPHONE ENCOUNTER
Outbound call to patient to discuss previous referrals placed by Dr. Pimentel.     Confirmed with patient that she is a drop from the program and no longer interested bariatric surgery.   Informed patient that the [facility] pre-cert dept. reached out  to our office requesting Dr. Pimentel schedule a peer to peer due to insurance not approving the colonoscopy procedure. Due to her dropping the program Dr. Pimentel order is invalid. She will need to follow up with her PCP who patient stated was the provider that ordered the colonoscopy and will follow her care. Patient stated the is an order from PCP. Explained to patient that unfortunately the PATI (Mindy) and I do not see a request in system for the colonoscopy from any other providers. Please call PCP and ask that they place a new order for the colonoscopy.     Patient than asked if this means her procedure on Wednesday is cancelled. I stated I do not know if her procedure will be cancelled I do not work in scheduling. She will need a new order and the ordering provider would need to obtain approval. If they do not get the approval prior to the schedule DOS then the schedulers would generally call patients to discuss next steps and if need to cancel or reschedule.      Patient expressed frustration with this whole process as well as  billing. Told me to cancel procedure.    Explained I cannot schedule or cancel and do not work in billing/pre-cert. If she would like I can the [facility] [place] and let them know she requested to cancel. Patient then stated she would call because she wants to let them know she's done with  for dropping the ball.     Apologized for her experience and wished patient well.     PATI Guillen present for call and aware of conversation.

## 2025-07-30 ENCOUNTER — APPOINTMENT (OUTPATIENT)
Dept: GASTROENTEROLOGY | Facility: HOSPITAL | Age: 45
End: 2025-07-30
Payer: COMMERCIAL

## 2025-08-26 ENCOUNTER — APPOINTMENT (OUTPATIENT)
Dept: SLEEP MEDICINE | Facility: CLINIC | Age: 45
End: 2025-08-26
Payer: COMMERCIAL

## 2025-11-18 ENCOUNTER — APPOINTMENT (OUTPATIENT)
Dept: PRIMARY CARE | Facility: CLINIC | Age: 45
End: 2025-11-18
Payer: COMMERCIAL

## 2026-07-16 ENCOUNTER — APPOINTMENT (OUTPATIENT)
Facility: CLINIC | Age: 46
End: 2026-07-16
Payer: COMMERCIAL